# Patient Record
Sex: MALE | Race: WHITE | NOT HISPANIC OR LATINO | Employment: FULL TIME | URBAN - METROPOLITAN AREA
[De-identification: names, ages, dates, MRNs, and addresses within clinical notes are randomized per-mention and may not be internally consistent; named-entity substitution may affect disease eponyms.]

---

## 2017-01-19 ENCOUNTER — RESULTS ENCOUNTER (OUTPATIENT)
Dept: FAMILY MEDICINE CLINIC | Facility: CLINIC | Age: 52
End: 2017-01-19

## 2017-01-19 DIAGNOSIS — I73.00 RAYNAUD'S DISEASE: ICD-10-CM

## 2017-01-19 DIAGNOSIS — E78.5 HYPERLIPIDEMIA: ICD-10-CM

## 2017-01-19 DIAGNOSIS — R17 TOTAL BILIRUBIN, ELEVATED: ICD-10-CM

## 2017-01-19 DIAGNOSIS — E03.9 ACQUIRED HYPOTHYROIDISM: ICD-10-CM

## 2017-01-19 DIAGNOSIS — K21.9 GASTROESOPHAGEAL REFLUX DISEASE, ESOPHAGITIS PRESENCE NOT SPECIFIED: ICD-10-CM

## 2017-02-26 LAB
ALBUMIN SERPL-MCNC: 4.4 G/DL (ref 3.5–5.5)
ALBUMIN/GLOB SERPL: 2 {RATIO} (ref 1.1–2.5)
ALP SERPL-CCNC: 45 IU/L (ref 39–117)
ALT SERPL-CCNC: 25 IU/L (ref 0–44)
AST SERPL-CCNC: 23 IU/L (ref 0–40)
BASOPHILS # BLD AUTO: 0 X10E3/UL (ref 0–0.2)
BASOPHILS NFR BLD AUTO: 1 %
BILIRUB SERPL-MCNC: 1.4 MG/DL (ref 0–1.2)
BUN SERPL-MCNC: 13 MG/DL (ref 6–24)
BUN/CREAT SERPL: 10 (ref 9–20)
CALCIUM SERPL-MCNC: 9.1 MG/DL (ref 8.7–10.2)
CHLORIDE SERPL-SCNC: 102 MMOL/L (ref 96–106)
CHOLEST SERPL-MCNC: 160 MG/DL (ref 100–199)
CO2 SERPL-SCNC: 25 MMOL/L (ref 18–29)
CREAT SERPL-MCNC: 1.24 MG/DL (ref 0.76–1.27)
EOSINOPHIL # BLD AUTO: 0.2 X10E3/UL (ref 0–0.4)
EOSINOPHIL NFR BLD AUTO: 4 %
ERYTHROCYTE [DISTWIDTH] IN BLOOD BY AUTOMATED COUNT: 13.6 % (ref 12.3–15.4)
GLOBULIN SER CALC-MCNC: 2.2 G/DL (ref 1.5–4.5)
GLUCOSE SERPL-MCNC: 103 MG/DL (ref 65–99)
HCT VFR BLD AUTO: 45.3 % (ref 37.5–51)
HDLC SERPL-MCNC: 68 MG/DL
HGB BLD-MCNC: 15.8 G/DL (ref 12.6–17.7)
IMM GRANULOCYTES # BLD: 0 X10E3/UL (ref 0–0.1)
IMM GRANULOCYTES NFR BLD: 0 %
LDLC SERPL CALC-MCNC: 79 MG/DL (ref 0–99)
LDLC/HDLC SERPL: 1.2 RATIO UNITS (ref 0–3.6)
LYMPHOCYTES # BLD AUTO: 1.6 X10E3/UL (ref 0.7–3.1)
LYMPHOCYTES NFR BLD AUTO: 32 %
MCH RBC QN AUTO: 33.8 PG (ref 26.6–33)
MCHC RBC AUTO-ENTMCNC: 34.9 G/DL (ref 31.5–35.7)
MCV RBC AUTO: 97 FL (ref 79–97)
MONOCYTES # BLD AUTO: 0.5 X10E3/UL (ref 0.1–0.9)
MONOCYTES NFR BLD AUTO: 11 %
NEUTROPHILS # BLD AUTO: 2.6 X10E3/UL (ref 1.4–7)
NEUTROPHILS NFR BLD AUTO: 52 %
PLATELET # BLD AUTO: 206 X10E3/UL (ref 150–379)
POTASSIUM SERPL-SCNC: 4.5 MMOL/L (ref 3.5–5.2)
PROT SERPL-MCNC: 6.6 G/DL (ref 6–8.5)
RBC # BLD AUTO: 4.68 X10E6/UL (ref 4.14–5.8)
SODIUM SERPL-SCNC: 143 MMOL/L (ref 134–144)
TRIGL SERPL-MCNC: 67 MG/DL (ref 0–149)
TSH SERPL DL<=0.005 MIU/L-ACNC: 5.18 UIU/ML (ref 0.45–4.5)
VLDLC SERPL CALC-MCNC: 13 MG/DL (ref 5–40)
WBC # BLD AUTO: 4.9 X10E3/UL (ref 3.4–10.8)

## 2017-03-01 ENCOUNTER — OFFICE VISIT (OUTPATIENT)
Dept: FAMILY MEDICINE CLINIC | Facility: CLINIC | Age: 52
End: 2017-03-01

## 2017-03-01 VITALS
HEIGHT: 75 IN | DIASTOLIC BLOOD PRESSURE: 79 MMHG | TEMPERATURE: 97.9 F | BODY MASS INDEX: 26.11 KG/M2 | SYSTOLIC BLOOD PRESSURE: 120 MMHG | WEIGHT: 210 LBS | HEART RATE: 76 BPM | RESPIRATION RATE: 16 BRPM

## 2017-03-01 DIAGNOSIS — E03.9 ACQUIRED HYPOTHYROIDISM: Primary | ICD-10-CM

## 2017-03-01 DIAGNOSIS — R17 TOTAL BILIRUBIN, ELEVATED: ICD-10-CM

## 2017-03-01 DIAGNOSIS — R73.03 PREDIABETES: ICD-10-CM

## 2017-03-01 DIAGNOSIS — E78.5 HYPERLIPIDEMIA, UNSPECIFIED HYPERLIPIDEMIA TYPE: ICD-10-CM

## 2017-03-01 DIAGNOSIS — K21.9 GASTROESOPHAGEAL REFLUX DISEASE, ESOPHAGITIS PRESENCE NOT SPECIFIED: ICD-10-CM

## 2017-03-01 PROCEDURE — 99214 OFFICE O/P EST MOD 30 MIN: CPT | Performed by: FAMILY MEDICINE

## 2017-03-01 RX ORDER — PRAVASTATIN SODIUM 40 MG
40 TABLET ORAL NIGHTLY
Qty: 90 TABLET | Refills: 1 | Status: SHIPPED | OUTPATIENT
Start: 2017-03-01 | End: 2017-08-23 | Stop reason: SDUPTHER

## 2017-03-01 RX ORDER — PRAVASTATIN SODIUM 40 MG
40 TABLET ORAL NIGHTLY
Qty: 14 TABLET | Refills: 0 | Status: SHIPPED | OUTPATIENT
Start: 2017-03-01 | End: 2017-03-01 | Stop reason: SDUPTHER

## 2017-03-01 RX ORDER — ESOMEPRAZOLE MAGNESIUM 40 MG/1
40 CAPSULE, DELAYED RELEASE ORAL
Qty: 90 CAPSULE | Refills: 1 | Status: SHIPPED | OUTPATIENT
Start: 2017-03-01 | End: 2017-08-23 | Stop reason: SINTOL

## 2017-03-01 RX ORDER — LEVOTHYROXINE SODIUM 88 UG/1
88 TABLET ORAL DAILY
Qty: 90 TABLET | Refills: 1 | Status: SHIPPED | OUTPATIENT
Start: 2017-03-01 | End: 2017-08-23 | Stop reason: SDUPTHER

## 2017-03-01 NOTE — PROGRESS NOTES
"Chief Complaint   Patient presents with   • Hyperlipidemia   • Heartburn   • Hypothyroidism       Subjective   This patient presents the office to review labs and refill medicines.  His thyroid is stable but there has been mild interval increase in his TSH.  This happened once before and we will monitor.  His skin condition is largely controlled.  He does periodically need medication to control dermatitis and gets that medication from his dermatologist.  GERD symptoms are stable with Nexium.  Lipids are stable with current statin dose.  Labs continue to show stable elevation of total bilirubin.  His fasting blood sugar is once again elevated but stable.    PHQ-2 Depression Screening Questionaire    During the last month, have you often been bothered by feeling down, depressed, or hopeless?no    During the last month, have you often been bothered by having little interest or pleasure in doing things?no    Review of Systems   Constitutional: Negative for fatigue.   Cardiovascular: Negative for chest pain.       Objective   Visit Vitals   • /79   • Pulse 76   • Temp 97.9 °F (36.6 °C) (Oral)   • Resp 16   • Ht 75\" (190.5 cm)   • Wt 210 lb (95.3 kg)   • BMI 26.25 kg/m2     Body mass index is 26.25 kg/(m^2).  Physical Exam   Constitutional: He is cooperative. No distress.   Eyes: Conjunctivae and lids are normal.   Neck: Carotid bruit is not present. No tracheal deviation present. No thyroid mass and no thyromegaly present.   Cardiovascular: Normal rate, regular rhythm and normal heart sounds.    No murmur heard.  Pulmonary/Chest: Effort normal and breath sounds normal.   Neurological: He is alert. He is not disoriented.   Skin: Skin is warm and dry.   Psychiatric: He has a normal mood and affect. His speech is normal and behavior is normal.   Vitals reviewed.      Assessment/Plan     Problem List Items Addressed This Visit        Cardiovascular and Mediastinum    Hyperlipidemia    Relevant Medications    " pravastatin (PRAVACHOL) 40 MG tablet    Other Relevant Orders    Lipid Panel With LDL/HDL Ratio       Digestive    GERD (gastroesophageal reflux disease)    Relevant Medications    esomeprazole (nexIUM) 40 MG capsule    Other Relevant Orders    Comprehensive metabolic panel    CBC and Differential       Endocrine    Hypothyroidism - Primary    Relevant Medications    levothyroxine (SYNTHROID) 88 MCG tablet    Other Relevant Orders    TSH       Other    Total bilirubin, elevated    Relevant Orders    Comprehensive metabolic panel    Prediabetes    Relevant Orders    Comprehensive metabolic panel    CBC and Differential          Outpatient Encounter Prescriptions as of 3/1/2017   Medication Sig Dispense Refill   • triamcinolone (KENALOG) 0.1 % ointment Apply  topically 2 (two) times a day. 80 g 0   • esomeprazole (nexIUM) 40 MG capsule Take 1 capsule by mouth Every Morning Before Breakfast for 180 days. 90 capsule 1   • levothyroxine (SYNTHROID) 88 MCG tablet Take 1 tablet by mouth Daily for 180 days. 90 tablet 1   • mupirocin (BACTROBAN) 2 % cream Apply  topically 2 (two) times a day. 30 g 0   • pravastatin (PRAVACHOL) 40 MG tablet Take 1 tablet by mouth Every Night. 90 tablet 1   • [DISCONTINUED] esomeprazole (NexIUM) 40 MG capsule Take 1 capsule by mouth every morning before breakfast for 180 days. 90 capsule 1   • [DISCONTINUED] levothyroxine (SYNTHROID) 88 MCG tablet Take 1 tablet by mouth daily for 180 days. 90 tablet 1   • [DISCONTINUED] pravastatin (PRAVACHOL) 40 MG tablet Take 1 tablet by mouth every night for 180 days. 90 tablet 1   • [DISCONTINUED] pravastatin (PRAVACHOL) 40 MG tablet Take 1 tablet by mouth Every Night. 14 tablet 0     No facility-administered encounter medications on file as of 3/1/2017.        Orders Placed This Encounter   Procedures   • Comprehensive metabolic panel     Standing Status:   Future     Standing Expiration Date:   11/26/2017   • Lipid Panel With LDL/HDL Ratio     Standing  Status:   Future     Standing Expiration Date:   11/26/2017   • TSH     Standing Status:   Future     Standing Expiration Date:   11/26/2017       Continue with current treatment plan.         F/U in 6 months

## 2017-03-01 NOTE — PATIENT INSTRUCTIONS
"  Diabetes   Diabetes and Nutrition      Why does it matter what I eat?    What you eat is closely connected to the amount of sugar in your blood. The right food choices will help you control your blood sugar level.    Do I have to follow a special diet?    There isn't one specific \"diabetes diet.\" Your doctor will probably suggest that you work with a registered dietitian to design a meal plan. A meal plan is a guide that tells you what kinds of food to eat at meals and for snacks. The plan also tells you how much food to have. For most people who have diabetes (and those without, too), a healthy diet consists of 40% to 60% of calories from carbohydrates, 20% from protein and 30% or less from fat. It should be low in cholesterol, low in salt and low in added sugar.    Can I eat any sugar?    Yes. In recent years, doctors have learned that eating some sugar doesn't usually cause problems for most people who have diabetes--as long as it is part of a balanced diet. Just be careful about how much sugar you eat and try not to add sugar to foods.    What kinds of foods can I eat?    In general, at each meal you may have 2 to 5 choices (or up to 60 grams) of carbohydrates, 1 choice of protein and a certain amount of fat. Talk to your doctor or dietitian for specific advice.    Carbohydrates. Carbohydrates are found in fruits, vegetables, beans, dairy foods and starchy foods such as breads. Try to have fresh fruits rather than canned fruits, fruit juices or dried fruit. You may eat fresh vegetables and frozen or canned vegetables. Condiments such as nonfat mayonnaise, ketchup and mustard are also carbohydrates.    Protein. Protein is found in meat, poultry, fish, dairy products, beans and some vegetables. Try to eat poultry and fish more often than red meat. Don't eat poultry skin, and trim extra fat from all meat. Choose nonfat or reduced-fat options when you eat dairy, such as cheeses and yogurts.    Fat. Butter, " margarine, lard and oils add fat to food. Fat is also in many dairy and meat products. Try to avoid fried foods, mayonnaise-based dishes (unless they are made with fat-free diez), egg yolks, christensen and high-fat dairy products. Your doctor or dietitian will tell you how many grams of fat you may eat each day. When eating fat-free versions of foods (such as mayonnaise and butter), check the label to see how many grams of carbohydrates they contain. Keep in mind that these products often have added sugar.       This handout was developed by the [employer] in cooperation with the American Diabetes Association.             Diabetes type 2 - meal planning      When you have type 2 diabetes, taking time to plan your meals goes a long way toward controlling your blood sugar and weight.    Your main focus is on keeping your blood sugar (glucose) level in your target range. To help manage your blood sugar, follow a meal plan that has:  •Food from all the food groups  •Fewer calories  •About the same amount of carbohydrates at each meal and snack  •Healthy fats    Along with healthy eating, you can keep your blood sugar in target range by maintaining a healthy weight. Persons with type 2 diabetes are often overweight. Losing just 10 pounds can help you manage your diabetes better. Eating healthy foods and staying active (for example, 30 minutes of walking per day) can help you meet and maintain your weight loss goal.     HOW CARBOHYDRATES AFFECT BLOOD SUGAR    Carbohydrates in food give your body energy. You need to eat carbohydrates to maintain your energy. But carbohydrates also raise your blood sugar higher and faster than other kinds of food.    The main kinds of carbohydrates are starches, sugars, and fiber. Learn which foods have carbohydrates. This will help with meal planning so that you can keep your blood sugar in your target range.    MEAL PLANNING FOR CHILDREN WITH TYPE 2 DIABETES    Meal  plans should consider the amount of calories children need to grow. In general, three small meals and three snacks a day can help meet calorie needs. Many children with type 2 diabetes are overweight. The goal should be a healthy weight by eating healthy foods and getting more activity (60 minutes each day).    Work with a registered dietitian to design a meal plan for your child. A registered dietitian is an expert in food and nutrition.    The following tips can help your child stay on track:  •No food is off-limits. Knowing how different foods affect your child’s blood sugar helps you and your child keep it in target range.  •Help your child learn how much food is a healthy amount. This is called portion control.  •Have your family gradually switch from drinking soda and other sugary drinks, such as sports drinks and juices, to plain water or low-fat milk.            PLANNING MEALS    Everyone has individual needs. Work with your doctor, registered dietitian, or diabetes educator to develop a meal plan that works for you.    When shopping, read food labels to make better food choices.    A good way to make sure you get all the nutrients you need during meals is to use the plate method. This is a visual food guide that helps you choose the best types and right amounts of food to eat. It encourages larger portions of non-starchy vegetables (half the plate) and moderate portions of protein (one quarter of the plate) and starch (one quarter of the plate). You can find more information about the plate method at the American Diabetes Association website: http://www.diabetes.org/food-and-fitness/food/planning-meals/create-your-plate.    EAT A VARIETY OF FOODS    Eating a wide variety of foods helps you stay healthy. Try to include foods from all the food groups at each meal.     VEGETABLES (2½ to 3 cups a day)    Choose fresh or frozen vegetables without added sauces, fats, or salt. Non-starchy vegetables include dark  green and deep yellow vegetables, such as spinach, broccoli, chloé lettuce, cabbage, chard, and bell peppers. Starchy vegetables include corn, green peas, lima beans, potatoes, and taro.    FRUITS (1½ to 2 cups a day)    Choose fresh, frozen, canned (without added sugar), or dried fruits. Try apples, bananas, berries, cherries, fruit cocktail, grapes, melon, oranges, peaches, pears, papaya, pineapple, raisins. Drink juices that are 100% fruit with no added sweeteners or syrups.    GRAINS (3 to 4 ounces a day)    There are two types of grains:  •Whole grains are unprocessed and have the entire grain kernel. Examples are whole-wheat flour, oatmeal, whole cornmeal, amaranth, barley, brown and wild rice, buckwheat, and quinoa.  •Refined grains have been processed (milled) to remove the bran and germ. Examples are white flour, de-germed cornmeal, white bread, and white rice.    Grains have starch, a type of carbohydrate. Carbohydrates raise your blood sugar level. So, for healthy eating, make sure half of the grains you eat each day are whole grains. Whole grains have lots of fiber. Fiber in the diet keeps your blood sugar level from rising too fast.           PROTEIN FOODS (5 to 6½ ounces a day)    Protein foods include meat, poultry, seafood, eggs, beans and peas, nuts, seeds, and processed soy foods. Eat fish and poultry more often. Remove the skin from chicken and turkey. Select lean cuts of beef, veal, pork, or wild game. Trim all visible fat from meat. Bake, roast, broil, grill, or boil instead of frying.    DAIRY (3 cups a day)    Choose low-fat or nonfat dairy products. Be aware that milk, yogurt, and other dairy foods have natural sugar even when they do not contain added sugar. Take this into account when planning meals to stay in your blood sugar target range.        OILS/FATS (no more than 7 teaspoons a day)    Oils are not considered a food group. But they have nutrients that help your body stay healthy.  Oils are different from fats in that oils remain liquid at room temperature. Fats remain solid at room temperature.    Limit your intake of fatty foods, especially those high in saturated fat, such as hamburgers, deep-fried foods, christensen, and butter.     Instead, choose foods that are high in polyunsaturated or monounsaturated fats. These include fish, nuts, and vegetable oils.    Oils can raise your blood sugar, but not as fast as starch. Oils are also high in calories. Try to use no more than the recommended daily limit of 7 teaspoons.    WHAT ABOUT ALCOHOL AND SWEETS?    If you choose to drink alcohol, limit the amount and have it with a meal. Check with your health care provider about how alcohol will affect your blood sugar and to determine a safe amount for you.    Sweets are high in fat and sugar. Keep portion sizes small.     Here are tips to help avoid eating too many sweets:  •Ask for extra spoons and forks and split your dessert with others.  •Eat sweets that are sugar-free.  •Always ask for the smallest serving size or children’s size.      A registered dietitian can help you decide how to balance the carbohydrates, protein, and fat in your diet. Here are some general guidelines:    The amount of each type of food you eat depends on:  •Your diet  •Your weight  •How often you exercise  •Your other health risks    Everyone has individual needs. Work with your doctor, and possibly a dietitian, to develop a meal plan that works for you.    The Diabetes Food Pyramid, which resembles the old USDA food guide pyramid, splits foods into six groups in a range of serving sizes. In the Diabetes Food Pyramid, food groups are based on carbohydrate and protein content instead of their food type. A person with diabetes should eat more of the foods in the bottom of the pyramid (grains, beans, vegetables) than those on the top (fats and sweets). This diet will help keep your heart and body systems healthy.    Another  "method, similar to the new \"plate\" USDA food guide, encourages larger portions of vegetables (half the plate) and moderate portions of protein (one-quarter of the plate) and starch (one-quarter of the plate).    GRAINS, BEANS, AND STARCHY VEGETABLES    (6 or more servings a day)    Foods like bread, grains, beans, rice, pasta, and starchy vegetables are at the bottom of the pyramid because they should serve as the foundation of your diet. As a group, these foods are loaded with vitamins, minerals, fiber, and healthy carbohydrates.    It is important, however, to eat foods with plenty of fiber. Choose whole-grain foods such as whole-grain bread or crackers, tortillas, bran cereal, brown rice, or beans. Use whole-wheat or other whole-grain flours in cooking and baking. Choose low-fat breads, such as bagels, tortillas, English muffins, and georges bread.    VEGETABLES    (3 - 5 servings a day)    Choose fresh or frozen vegetables without added sauces, fats, or salt. Opt for more dark green and deep yellow vegetables, such as spinach, broccoli, chloé lettuce, carrots, and peppers.    FRUITS    (2 - 4 servings a day)    Choose whole fruits more often than juices. Whole fruits have more fiber. Citrus fruits, such as oranges, grapefruits, and tangerines, are best. Drink fruit juices that do NOT have added sweeteners or syrups.    MILK    (2 - 3 servings a day)    Choose low-fat or nonfat milk or yogurt. Yogurt has natural sugar in it, but it can also contain added sugar or artificial sweeteners. Yogurt with artificial sweeteners has fewer calories than yogurt with added sugar.    MEAT AND FISH    (2 - 3 servings a day)    Eat fish and poultry more often. Remove the skin from chicken and turkey. Select lean cuts of beef, veal, pork, or wild game. Trim all visible fat from meat. Bake, roast, broil, grill, or boil instead of frying.    FATS, ALCOHOL, AND SWEETS    In general, you should limit your intake of fatty foods, " especially those high in saturated fat, such as hamburgers, cheese, christensen, and butter.    If you choose to drink alcohol, limit the amount and have it with a meal. Check with your health care provider about how alcohol will affect your blood sugar, and to determine a safe amount for you.    Sweets are high in fat and sugar, so keep portion sizes small. Here are some tips to help avoid eating too many sweets:  •Ask for extra spoons and forks and split your dessert with others.  •Eat sweets that are sugar-free.  •Always ask for the small serving size.    Learn how to read food labels, and consult them when making food decisions.      References      American Diabetes Association. Standards of medical care in diabetes -- 2013.American Diabetes Association. Standards of medical care in diabetes -- 2013. Diabetes Care. 2013;36 Suppl 1:S11-S66

## 2017-07-29 ENCOUNTER — RESULTS ENCOUNTER (OUTPATIENT)
Dept: FAMILY MEDICINE CLINIC | Facility: CLINIC | Age: 52
End: 2017-07-29

## 2017-07-29 DIAGNOSIS — K21.9 GASTROESOPHAGEAL REFLUX DISEASE, ESOPHAGITIS PRESENCE NOT SPECIFIED: ICD-10-CM

## 2017-07-29 DIAGNOSIS — R17 TOTAL BILIRUBIN, ELEVATED: ICD-10-CM

## 2017-07-29 DIAGNOSIS — R73.03 PREDIABETES: ICD-10-CM

## 2017-07-29 DIAGNOSIS — E78.5 HYPERLIPIDEMIA, UNSPECIFIED HYPERLIPIDEMIA TYPE: ICD-10-CM

## 2017-07-29 DIAGNOSIS — E03.9 ACQUIRED HYPOTHYROIDISM: ICD-10-CM

## 2017-08-17 LAB
ALBUMIN SERPL-MCNC: 4.6 G/DL (ref 3.5–5.5)
ALBUMIN/GLOB SERPL: 2.1 {RATIO} (ref 1.2–2.2)
ALP SERPL-CCNC: 42 IU/L (ref 39–117)
ALT SERPL-CCNC: 22 IU/L (ref 0–44)
AST SERPL-CCNC: 26 IU/L (ref 0–40)
BASOPHILS # BLD AUTO: 0 X10E3/UL (ref 0–0.2)
BASOPHILS NFR BLD AUTO: 1 %
BILIRUB SERPL-MCNC: 2.3 MG/DL (ref 0–1.2)
BUN SERPL-MCNC: 14 MG/DL (ref 6–24)
BUN/CREAT SERPL: 10 (ref 9–20)
CALCIUM SERPL-MCNC: 9 MG/DL (ref 8.7–10.2)
CHLORIDE SERPL-SCNC: 99 MMOL/L (ref 96–106)
CHOLEST SERPL-MCNC: 136 MG/DL (ref 100–199)
CO2 SERPL-SCNC: 22 MMOL/L (ref 18–29)
CREAT SERPL-MCNC: 1.38 MG/DL (ref 0.76–1.27)
EOSINOPHIL # BLD AUTO: 0.1 X10E3/UL (ref 0–0.4)
EOSINOPHIL NFR BLD AUTO: 3 %
ERYTHROCYTE [DISTWIDTH] IN BLOOD BY AUTOMATED COUNT: 13 % (ref 12.3–15.4)
GLOBULIN SER CALC-MCNC: 2.2 G/DL (ref 1.5–4.5)
GLUCOSE SERPL-MCNC: 78 MG/DL (ref 65–99)
HCT VFR BLD AUTO: 40.7 % (ref 37.5–51)
HDLC SERPL-MCNC: 64 MG/DL
HGB BLD-MCNC: 14.3 G/DL (ref 12.6–17.7)
IMM GRANULOCYTES # BLD: 0 X10E3/UL (ref 0–0.1)
IMM GRANULOCYTES NFR BLD: 0 %
LDLC SERPL CALC-MCNC: 63 MG/DL (ref 0–99)
LDLC/HDLC SERPL: 1 RATIO UNITS (ref 0–3.6)
LYMPHOCYTES # BLD AUTO: 1.6 X10E3/UL (ref 0.7–3.1)
LYMPHOCYTES NFR BLD AUTO: 38 %
MCH RBC QN AUTO: 32.2 PG (ref 26.6–33)
MCHC RBC AUTO-ENTMCNC: 35.1 G/DL (ref 31.5–35.7)
MCV RBC AUTO: 92 FL (ref 79–97)
MONOCYTES # BLD AUTO: 0.4 X10E3/UL (ref 0.1–0.9)
MONOCYTES NFR BLD AUTO: 10 %
NEUTROPHILS # BLD AUTO: 2.1 X10E3/UL (ref 1.4–7)
NEUTROPHILS NFR BLD AUTO: 48 %
PLATELET # BLD AUTO: 193 X10E3/UL (ref 150–379)
POTASSIUM SERPL-SCNC: 3.7 MMOL/L (ref 3.5–5.2)
PROT SERPL-MCNC: 6.8 G/DL (ref 6–8.5)
RBC # BLD AUTO: 4.44 X10E6/UL (ref 4.14–5.8)
SODIUM SERPL-SCNC: 142 MMOL/L (ref 134–144)
TRIGL SERPL-MCNC: 47 MG/DL (ref 0–149)
TSH SERPL DL<=0.005 MIU/L-ACNC: 4.01 UIU/ML (ref 0.45–4.5)
VLDLC SERPL CALC-MCNC: 9 MG/DL (ref 5–40)
WBC # BLD AUTO: 4.2 X10E3/UL (ref 3.4–10.8)

## 2017-08-21 ENCOUNTER — OFFICE VISIT (OUTPATIENT)
Dept: FAMILY MEDICINE CLINIC | Facility: CLINIC | Age: 52
End: 2017-08-21

## 2017-08-21 DIAGNOSIS — E78.5 HYPERLIPIDEMIA, UNSPECIFIED HYPERLIPIDEMIA TYPE: ICD-10-CM

## 2017-08-21 DIAGNOSIS — K21.9 GASTROESOPHAGEAL REFLUX DISEASE, ESOPHAGITIS PRESENCE NOT SPECIFIED: ICD-10-CM

## 2017-08-21 DIAGNOSIS — E03.9 ACQUIRED HYPOTHYROIDISM: ICD-10-CM

## 2017-08-21 DIAGNOSIS — R73.03 PREDIABETES: ICD-10-CM

## 2017-08-21 DIAGNOSIS — E78.49 OTHER HYPERLIPIDEMIA: Primary | ICD-10-CM

## 2017-08-21 DIAGNOSIS — R17 TOTAL BILIRUBIN, ELEVATED: ICD-10-CM

## 2017-08-21 PROCEDURE — 99214 OFFICE O/P EST MOD 30 MIN: CPT | Performed by: FAMILY MEDICINE

## 2017-08-22 VITALS
SYSTOLIC BLOOD PRESSURE: 123 MMHG | RESPIRATION RATE: 16 BRPM | BODY MASS INDEX: 25.61 KG/M2 | HEART RATE: 76 BPM | WEIGHT: 206 LBS | DIASTOLIC BLOOD PRESSURE: 80 MMHG | HEIGHT: 75 IN | TEMPERATURE: 97.4 F

## 2017-08-23 RX ORDER — LEVOTHYROXINE SODIUM 88 UG/1
88 TABLET ORAL DAILY
Qty: 90 TABLET | Refills: 1
Start: 2017-08-23 | End: 2017-11-30 | Stop reason: SDUPTHER

## 2017-08-23 RX ORDER — RANITIDINE 150 MG/1
150 CAPSULE ORAL 2 TIMES DAILY
Qty: 180 CAPSULE | Refills: 1
Start: 2017-08-23 | End: 2017-11-30 | Stop reason: SDUPTHER

## 2017-08-23 RX ORDER — PRAVASTATIN SODIUM 40 MG
40 TABLET ORAL NIGHTLY
Qty: 90 TABLET | Refills: 1
Start: 2017-08-23 | End: 2017-11-30 | Stop reason: SDUPTHER

## 2017-08-23 NOTE — PROGRESS NOTES
"Chief Complaint   Patient presents with   • Hyperlipidemia   • Hypothyroidism       Subjective   Pt here for med refill. Thyroid stable. gerd stable. Hyperlipidemia stable. Elevated creatinine noted. Total bilirubin stable.  I have reviewed and updated his medications, medical history and problem list during today's office visit.        Social History   Substance Use Topics   • Smoking status: Former Smoker     Packs/day: 0.50   • Smokeless tobacco: Never Used   • Alcohol use No       Review of Systems   Constitutional: Negative for fatigue.   Cardiovascular: Negative for chest pain.       Objective   /80  Pulse 76  Temp 97.4 °F (36.3 °C) (Oral)   Resp 16  Ht 75\" (190.5 cm)  Wt 206 lb (93.4 kg)  BMI 25.75 kg/m2  Body mass index is 25.75 kg/(m^2).  Physical Exam   Constitutional: He is cooperative. No distress.   Eyes: Conjunctivae and lids are normal.   Neck: Carotid bruit is not present. No tracheal deviation present.   Cardiovascular: Normal rate, regular rhythm and normal heart sounds.    No murmur heard.  Pulmonary/Chest: Effort normal and breath sounds normal.   Neurological: He is alert. He is not disoriented.   Skin: Skin is warm and dry.   Psychiatric: He has a normal mood and affect. His speech is normal and behavior is normal.   Vitals reviewed.      Data Reviewed:        Assessment/Plan     Problem List Items Addressed This Visit        Cardiovascular and Mediastinum    Hyperlipidemia - Primary    Relevant Medications    pravastatin (PRAVACHOL) 40 MG tablet    Other Relevant Orders    Comprehensive metabolic panel    Lipid Panel With LDL/HDL Ratio    CBC and Differential       Digestive    GERD (gastroesophageal reflux disease)    Relevant Medications    ranitidine (ZANTAC) 150 MG capsule       Endocrine    Hypothyroidism    Relevant Medications    levothyroxine (SYNTHROID) 88 MCG tablet    Other Relevant Orders    TSH       Other    Total bilirubin, elevated    Prediabetes          Outpatient " Encounter Prescriptions as of 8/21/2017   Medication Sig Dispense Refill   • levothyroxine (SYNTHROID) 88 MCG tablet Take 1 tablet by mouth Daily for 180 days. 90 tablet 1   • mupirocin (BACTROBAN) 2 % cream Apply  topically 2 (two) times a day. 30 g 0   • pravastatin (PRAVACHOL) 40 MG tablet Take 1 tablet by mouth Every Night. 90 tablet 1   • triamcinolone (KENALOG) 0.1 % ointment Apply  topically 2 (two) times a day. 80 g 0   • [DISCONTINUED] esomeprazole (nexIUM) 40 MG capsule Take 1 capsule by mouth Every Morning Before Breakfast for 180 days. 90 capsule 1   • [DISCONTINUED] levothyroxine (SYNTHROID) 88 MCG tablet Take 1 tablet by mouth Daily for 180 days. 90 tablet 1   • [DISCONTINUED] pravastatin (PRAVACHOL) 40 MG tablet Take 1 tablet by mouth Every Night. 90 tablet 1   • ranitidine (ZANTAC) 150 MG capsule Take 1 capsule by mouth 2 (Two) Times a Day for 180 days. 180 capsule 1     No facility-administered encounter medications on file as of 8/21/2017.        Orders Placed This Encounter   Procedures   • Comprehensive metabolic panel     Standing Status:   Future     Standing Expiration Date:   5/20/2018   • Lipid Panel With LDL/HDL Ratio     Standing Status:   Future     Standing Expiration Date:   5/20/2018   • TSH     Standing Status:   Future     Standing Expiration Date:   5/20/2018   • CBC and Differential     Standing Status:   Future     Standing Expiration Date:   5/20/2018     Order Specific Question:   Manual Differential     Answer:   No       Continue with current treatment plan.  Avoid NSAIDS  Drink 6-8 glasses water daily       F/U in 6 months

## 2017-11-30 ENCOUNTER — TELEPHONE (OUTPATIENT)
Dept: FAMILY MEDICINE CLINIC | Facility: CLINIC | Age: 52
End: 2017-11-30

## 2017-11-30 DIAGNOSIS — K21.9 GASTROESOPHAGEAL REFLUX DISEASE, ESOPHAGITIS PRESENCE NOT SPECIFIED: ICD-10-CM

## 2017-11-30 DIAGNOSIS — E03.9 ACQUIRED HYPOTHYROIDISM: ICD-10-CM

## 2017-11-30 DIAGNOSIS — E78.5 HYPERLIPIDEMIA, UNSPECIFIED HYPERLIPIDEMIA TYPE: ICD-10-CM

## 2017-11-30 RX ORDER — LEVOTHYROXINE SODIUM 88 UG/1
88 TABLET ORAL DAILY
Qty: 90 TABLET | Refills: 1
Start: 2017-11-30 | End: 2017-12-13 | Stop reason: SDUPTHER

## 2017-11-30 RX ORDER — PRAVASTATIN SODIUM 40 MG
40 TABLET ORAL NIGHTLY
Qty: 90 TABLET | Refills: 1
Start: 2017-11-30 | End: 2017-12-13 | Stop reason: SDUPTHER

## 2017-11-30 RX ORDER — RANITIDINE 150 MG/1
150 CAPSULE ORAL 2 TIMES DAILY
Qty: 180 CAPSULE | Refills: 1
Start: 2017-11-30 | End: 2017-12-13 | Stop reason: SDUPTHER

## 2017-12-13 ENCOUNTER — TELEPHONE (OUTPATIENT)
Dept: FAMILY MEDICINE CLINIC | Facility: CLINIC | Age: 52
End: 2017-12-13

## 2017-12-13 DIAGNOSIS — E03.9 ACQUIRED HYPOTHYROIDISM: ICD-10-CM

## 2017-12-13 DIAGNOSIS — E78.5 HYPERLIPIDEMIA, UNSPECIFIED HYPERLIPIDEMIA TYPE: ICD-10-CM

## 2017-12-13 DIAGNOSIS — K21.9 GASTROESOPHAGEAL REFLUX DISEASE, ESOPHAGITIS PRESENCE NOT SPECIFIED: ICD-10-CM

## 2017-12-13 RX ORDER — RANITIDINE 150 MG/1
150 CAPSULE ORAL 2 TIMES DAILY
Qty: 90 CAPSULE | Refills: 1 | Status: SHIPPED | OUTPATIENT
Start: 2017-12-13 | End: 2018-06-06 | Stop reason: SDUPTHER

## 2017-12-13 RX ORDER — LEVOTHYROXINE SODIUM 88 UG/1
88 TABLET ORAL DAILY
Qty: 90 TABLET | Refills: 1 | Status: SHIPPED | OUTPATIENT
Start: 2017-12-13 | End: 2018-06-06 | Stop reason: SDUPTHER

## 2017-12-13 RX ORDER — PRAVASTATIN SODIUM 40 MG
40 TABLET ORAL NIGHTLY
Qty: 14 TABLET | Refills: 0 | Status: SHIPPED | OUTPATIENT
Start: 2017-12-13 | End: 2017-12-13 | Stop reason: SDUPTHER

## 2017-12-13 RX ORDER — LEVOTHYROXINE SODIUM 88 UG/1
88 TABLET ORAL DAILY
Qty: 14 TABLET | Refills: 0 | Status: SHIPPED | OUTPATIENT
Start: 2017-12-13 | End: 2017-12-13 | Stop reason: SDUPTHER

## 2017-12-13 RX ORDER — RANITIDINE 150 MG/1
150 CAPSULE ORAL 2 TIMES DAILY
Qty: 14 CAPSULE | Refills: 0 | Status: SHIPPED | OUTPATIENT
Start: 2017-12-13 | End: 2017-12-13 | Stop reason: SDUPTHER

## 2017-12-13 RX ORDER — PRAVASTATIN SODIUM 40 MG
40 TABLET ORAL NIGHTLY
Qty: 90 TABLET | Refills: 1 | Status: SHIPPED | OUTPATIENT
Start: 2017-12-13 | End: 2018-06-06 | Stop reason: SDUPTHER

## 2018-01-20 ENCOUNTER — RESULTS ENCOUNTER (OUTPATIENT)
Dept: FAMILY MEDICINE CLINIC | Facility: CLINIC | Age: 53
End: 2018-01-20

## 2018-01-20 DIAGNOSIS — E03.9 ACQUIRED HYPOTHYROIDISM: ICD-10-CM

## 2018-01-20 DIAGNOSIS — E78.49 OTHER HYPERLIPIDEMIA: ICD-10-CM

## 2018-06-01 LAB
ALBUMIN SERPL-MCNC: 4.6 G/DL (ref 3.5–5.2)
ALBUMIN/GLOB SERPL: 2 G/DL
ALP SERPL-CCNC: 44 U/L (ref 39–117)
ALT SERPL-CCNC: 22 U/L (ref 1–41)
AST SERPL-CCNC: 21 U/L (ref 1–40)
BASOPHILS # BLD AUTO: 0.03 10*3/MM3 (ref 0–0.2)
BASOPHILS NFR BLD AUTO: 0.6 % (ref 0–1.5)
BILIRUB SERPL-MCNC: 2.2 MG/DL (ref 0.1–1.2)
BUN SERPL-MCNC: 12 MG/DL (ref 6–20)
BUN/CREAT SERPL: 9.8 (ref 7–25)
CALCIUM SERPL-MCNC: 9.3 MG/DL (ref 8.6–10.5)
CHLORIDE SERPL-SCNC: 100 MMOL/L (ref 98–107)
CHOLEST SERPL-MCNC: 166 MG/DL (ref 0–200)
CO2 SERPL-SCNC: 29.2 MMOL/L (ref 22–29)
CREAT SERPL-MCNC: 1.23 MG/DL (ref 0.76–1.27)
EOSINOPHIL # BLD AUTO: 0.22 10*3/MM3 (ref 0–0.7)
EOSINOPHIL NFR BLD AUTO: 4.2 % (ref 0.3–6.2)
ERYTHROCYTE [DISTWIDTH] IN BLOOD BY AUTOMATED COUNT: 13 % (ref 11.5–14.5)
GFR SERPLBLD CREATININE-BSD FMLA CKD-EPI: 61 ML/MIN/1.73
GFR SERPLBLD CREATININE-BSD FMLA CKD-EPI: 74 ML/MIN/1.73
GLOBULIN SER CALC-MCNC: 2.3 GM/DL
GLUCOSE SERPL-MCNC: 96 MG/DL (ref 65–99)
HCT VFR BLD AUTO: 47.6 % (ref 40.4–52.2)
HDLC SERPL-MCNC: 61 MG/DL (ref 40–60)
HGB BLD-MCNC: 16.4 G/DL (ref 13.7–17.6)
IMM GRANULOCYTES # BLD: 0 10*3/MM3 (ref 0–0.03)
IMM GRANULOCYTES NFR BLD: 0 % (ref 0–0.5)
LDLC SERPL CALC-MCNC: 81 MG/DL (ref 0–100)
LDLC/HDLC SERPL: 1.33 {RATIO}
LYMPHOCYTES # BLD AUTO: 1.72 10*3/MM3 (ref 0.9–4.8)
LYMPHOCYTES NFR BLD AUTO: 33.2 % (ref 19.6–45.3)
MCH RBC QN AUTO: 33.7 PG (ref 27–32.7)
MCHC RBC AUTO-ENTMCNC: 34.5 G/DL (ref 32.6–36.4)
MCV RBC AUTO: 97.7 FL (ref 79.8–96.2)
MONOCYTES # BLD AUTO: 0.46 10*3/MM3 (ref 0.2–1.2)
MONOCYTES NFR BLD AUTO: 8.9 % (ref 5–12)
NEUTROPHILS # BLD AUTO: 2.75 10*3/MM3 (ref 1.9–8.1)
NEUTROPHILS NFR BLD AUTO: 53.1 % (ref 42.7–76)
PLATELET # BLD AUTO: 195 10*3/MM3 (ref 140–500)
POTASSIUM SERPL-SCNC: 4.8 MMOL/L (ref 3.5–5.2)
PROT SERPL-MCNC: 6.9 G/DL (ref 6–8.5)
RBC # BLD AUTO: 4.87 10*6/MM3 (ref 4.6–6)
SODIUM SERPL-SCNC: 143 MMOL/L (ref 136–145)
TRIGL SERPL-MCNC: 118 MG/DL (ref 0–150)
TSH SERPL DL<=0.005 MIU/L-ACNC: 4.71 MIU/ML (ref 0.27–4.2)
VLDLC SERPL CALC-MCNC: 23.6 MG/DL (ref 5–40)
WBC # BLD AUTO: 5.18 10*3/MM3 (ref 4.5–10.7)

## 2018-06-06 ENCOUNTER — OFFICE VISIT (OUTPATIENT)
Dept: FAMILY MEDICINE CLINIC | Facility: CLINIC | Age: 53
End: 2018-06-06

## 2018-06-06 VITALS
BODY MASS INDEX: 25.24 KG/M2 | TEMPERATURE: 97.2 F | DIASTOLIC BLOOD PRESSURE: 69 MMHG | HEIGHT: 75 IN | WEIGHT: 203 LBS | RESPIRATION RATE: 16 BRPM | SYSTOLIC BLOOD PRESSURE: 103 MMHG | HEART RATE: 79 BPM

## 2018-06-06 DIAGNOSIS — E03.9 ACQUIRED HYPOTHYROIDISM: Primary | ICD-10-CM

## 2018-06-06 DIAGNOSIS — E78.00 PURE HYPERCHOLESTEROLEMIA: ICD-10-CM

## 2018-06-06 DIAGNOSIS — R17 TOTAL BILIRUBIN, ELEVATED: ICD-10-CM

## 2018-06-06 DIAGNOSIS — K21.9 GASTROESOPHAGEAL REFLUX DISEASE WITHOUT ESOPHAGITIS: ICD-10-CM

## 2018-06-06 PROBLEM — R73.03 PREDIABETES: Status: RESOLVED | Noted: 2017-03-01 | Resolved: 2018-06-06

## 2018-06-06 PROCEDURE — 99214 OFFICE O/P EST MOD 30 MIN: CPT | Performed by: FAMILY MEDICINE

## 2018-06-06 RX ORDER — PRAVASTATIN SODIUM 40 MG
40 TABLET ORAL NIGHTLY
Qty: 90 TABLET | Refills: 1 | Status: SHIPPED | OUTPATIENT
Start: 2018-06-06 | End: 2018-12-10 | Stop reason: SDUPTHER

## 2018-06-06 RX ORDER — LEVOTHYROXINE SODIUM 88 UG/1
88 TABLET ORAL DAILY
Qty: 90 TABLET | Refills: 1 | Status: SHIPPED | OUTPATIENT
Start: 2018-06-06 | End: 2018-12-13 | Stop reason: SDUPTHER

## 2018-06-06 RX ORDER — RANITIDINE 150 MG/1
150 CAPSULE ORAL 2 TIMES DAILY
Qty: 180 CAPSULE | Refills: 1 | Status: SHIPPED | OUTPATIENT
Start: 2018-06-06 | End: 2018-12-10

## 2018-06-06 NOTE — PROGRESS NOTES
"Chief Complaint   Patient presents with   • Hypothyroidism   • Hyperlipidemia       Subjective     Rafita Gary presents to the office today to refill his medications and review recent labs. No medication side effects are reported.  His thyroid is controlled.  GERD symptoms are stable.  Since changing from proton pump inhibitor back to H2 blocker his kidney function has returned to normal.  H2 blocker is not quite as effective as proton pump inhibitor.  Cholesterol is controlled.  Elevation of total bilirubin is stable.  His TSH is just minimally elevated but is not associated with any evidence of clinical hypothyroidism.    I have reviewed and updated his medications, medical history and problem list during today's office visit.      Patient Care Team:  Gilson Lancaster MD as PCP - General (Family Medicine)  Gilson Lancaster MD as PCP - Family Medicine    Social History   Substance Use Topics   • Smoking status: Former Smoker     Packs/day: 0.50     Years: 15.00     Start date: 7/1/1973     Quit date: 2/17/1988   • Smokeless tobacco: Never Used   • Alcohol use No       Review of Systems   Constitutional: Negative for fatigue.   Cardiovascular: Negative for chest pain.       Objective     /69   Pulse 79   Temp 97.2 °F (36.2 °C) (Oral)   Resp 16   Ht 190.5 cm (75\")   Wt 92.1 kg (203 lb)   BMI 25.37 kg/m²     Body mass index is 25.37 kg/m².    Physical Exam   Constitutional: He is oriented to person, place, and time. He appears well-developed. No distress.   Eyes: Conjunctivae and lids are normal.   Neck: Trachea normal and phonation normal. No tracheal tenderness present. Carotid bruit is not present. No thyroid mass and no thyromegaly present.   Cardiovascular: Normal rate, regular rhythm and normal heart sounds.    Pulmonary/Chest: Effort normal and breath sounds normal.   Neurological: He is alert and oriented to person, place, and time.   Skin: Skin is warm and dry.   Psychiatric: He has a normal mood " and affect. His behavior is normal.   Vitals reviewed.      Data Reviewed:         Lab Results   Component Value Date    GLU 96 05/31/2018    BUN 12 05/31/2018    CREATININE 1.23 05/31/2018    EGFRIFNONA 61 05/31/2018    EGFRIFAFRI 74 05/31/2018     05/31/2018    K 4.8 05/31/2018     05/31/2018    CALCIUM 9.3 05/31/2018    PROTENTOTREF 6.9 05/31/2018    ALBUMIN 4.60 05/31/2018    LABGLOBREF 2.3 05/31/2018    BILITOT 2.2 (H) 05/31/2018    ALKPHOS 44 05/31/2018    AST 21 05/31/2018    ALT 22 05/31/2018     CBC w/ diff:   Lab Results   Component Value Date    WBC 4.2 08/16/2017    RBC 4.87 05/31/2018    HGB 16.4 05/31/2018    HCT 47.6 05/31/2018    MCV 97.7 (H) 05/31/2018    MCH 33.7 (H) 05/31/2018    MCHC 34.5 05/31/2018    RDW 13.0 05/31/2018     05/31/2018    NEUTRORELPCT 53.1 05/31/2018    LYMPHORELPCT 33.2 05/31/2018    MONORELPCT 8.9 05/31/2018    EOSRELPCT 4.2 05/31/2018    BASORELPCT 0.6 05/31/2018     LIPID PANEL:  Lab Results   Component Value Date    CHLPL 166 05/31/2018    TRIG 118 05/31/2018    HDL 61 (H) 05/31/2018    VLDL 23.6 05/31/2018    LDL 81 05/31/2018    LDLHDL 1.33 05/31/2018     TSH:  Lab Results   Component Value Date    TSH 4.710 (H) 05/31/2018       Assessment/Plan     Problem List Items Addressed This Visit     Acquired hypothyroidism - Primary    Relevant Medications    levothyroxine (SYNTHROID) 88 MCG tablet    Other Relevant Orders    TSH    Gastroesophageal reflux disease without esophagitis    Relevant Medications    ranitidine (ZANTAC) 150 MG capsule    Pure hypercholesterolemia    Relevant Medications    pravastatin (PRAVACHOL) 40 MG tablet    Other Relevant Orders    Comprehensive metabolic panel    Lipid Panel With LDL/HDL Ratio    CBC and Differential    Total bilirubin, elevated          Orders Placed This Encounter   Procedures   • Comprehensive metabolic panel     Standing Status:   Future     Standing Expiration Date:   3/3/2019   • Lipid Panel With LDL/HDL  Ratio     Standing Status:   Future     Standing Expiration Date:   3/3/2019   • TSH     Standing Status:   Future     Standing Expiration Date:   3/3/2019   • CBC and Differential     Standing Status:   Future     Standing Expiration Date:   3/3/2019     Order Specific Question:   Manual Differential     Answer:   No         Current Outpatient Prescriptions:   •  levothyroxine (SYNTHROID) 88 MCG tablet, Take 1 tablet by mouth Daily for 180 days., Disp: 90 tablet, Rfl: 1  •  pravastatin (PRAVACHOL) 40 MG tablet, Take 1 tablet by mouth Every Night., Disp: 90 tablet, Rfl: 1  •  ranitidine (ZANTAC) 150 MG capsule, Take 1 capsule by mouth 2 (Two) Times a Day for 180 days., Disp: 180 capsule, Rfl: 1    Return in about 6 months (around 12/6/2018) for Recheck.

## 2018-11-03 ENCOUNTER — RESULTS ENCOUNTER (OUTPATIENT)
Dept: FAMILY MEDICINE CLINIC | Facility: CLINIC | Age: 53
End: 2018-11-03

## 2018-11-03 DIAGNOSIS — E78.00 PURE HYPERCHOLESTEROLEMIA: ICD-10-CM

## 2018-11-03 DIAGNOSIS — E03.9 ACQUIRED HYPOTHYROIDISM: ICD-10-CM

## 2018-12-07 LAB
ALBUMIN SERPL-MCNC: 4.6 G/DL (ref 3.5–5.5)
ALBUMIN/GLOB SERPL: 2.2 {RATIO} (ref 1.2–2.2)
ALP SERPL-CCNC: 42 IU/L (ref 39–117)
ALT SERPL-CCNC: 17 IU/L (ref 0–44)
AST SERPL-CCNC: 25 IU/L (ref 0–40)
BASOPHILS # BLD AUTO: 0.1 X10E3/UL (ref 0–0.2)
BASOPHILS NFR BLD AUTO: 1 %
BILIRUB SERPL-MCNC: 1.5 MG/DL (ref 0–1.2)
BUN SERPL-MCNC: 16 MG/DL (ref 6–24)
BUN/CREAT SERPL: 11 (ref 9–20)
CALCIUM SERPL-MCNC: 9.4 MG/DL (ref 8.7–10.2)
CHLORIDE SERPL-SCNC: 102 MMOL/L (ref 96–106)
CHOLEST SERPL-MCNC: 160 MG/DL (ref 100–199)
CO2 SERPL-SCNC: 25 MMOL/L (ref 20–29)
CREAT SERPL-MCNC: 1.46 MG/DL (ref 0.76–1.27)
EOSINOPHIL # BLD AUTO: 0.4 X10E3/UL (ref 0–0.4)
EOSINOPHIL NFR BLD AUTO: 7 %
ERYTHROCYTE [DISTWIDTH] IN BLOOD BY AUTOMATED COUNT: 13.7 % (ref 12.3–15.4)
GLOBULIN SER CALC-MCNC: 2.1 G/DL (ref 1.5–4.5)
GLUCOSE SERPL-MCNC: 86 MG/DL (ref 65–99)
HCT VFR BLD AUTO: 43.3 % (ref 37.5–51)
HDLC SERPL-MCNC: 61 MG/DL
HGB BLD-MCNC: 14.6 G/DL (ref 13–17.7)
IMM GRANULOCYTES # BLD: 0 X10E3/UL (ref 0–0.1)
IMM GRANULOCYTES NFR BLD: 0 %
LDLC SERPL CALC-MCNC: 85 MG/DL (ref 0–99)
LDLC/HDLC SERPL: 1.4 RATIO (ref 0–3.6)
LYMPHOCYTES # BLD AUTO: 2 X10E3/UL (ref 0.7–3.1)
LYMPHOCYTES NFR BLD AUTO: 36 %
MCH RBC QN AUTO: 33 PG (ref 26.6–33)
MCHC RBC AUTO-ENTMCNC: 33.7 G/DL (ref 31.5–35.7)
MCV RBC AUTO: 98 FL (ref 79–97)
MONOCYTES # BLD AUTO: 0.7 X10E3/UL (ref 0.1–0.9)
MONOCYTES NFR BLD AUTO: 13 %
NEUTROPHILS # BLD AUTO: 2.3 X10E3/UL (ref 1.4–7)
NEUTROPHILS NFR BLD AUTO: 43 %
PLATELET # BLD AUTO: 204 X10E3/UL (ref 150–379)
POTASSIUM SERPL-SCNC: 4.3 MMOL/L (ref 3.5–5.2)
PROT SERPL-MCNC: 6.7 G/DL (ref 6–8.5)
RBC # BLD AUTO: 4.42 X10E6/UL (ref 4.14–5.8)
SODIUM SERPL-SCNC: 143 MMOL/L (ref 134–144)
TRIGL SERPL-MCNC: 71 MG/DL (ref 0–149)
TSH SERPL DL<=0.005 MIU/L-ACNC: 7.53 UIU/ML (ref 0.45–4.5)
VLDLC SERPL CALC-MCNC: 14 MG/DL (ref 5–40)
WBC # BLD AUTO: 5.5 X10E3/UL (ref 3.4–10.8)

## 2018-12-10 ENCOUNTER — OFFICE VISIT (OUTPATIENT)
Dept: FAMILY MEDICINE CLINIC | Facility: CLINIC | Age: 53
End: 2018-12-10

## 2018-12-10 VITALS
BODY MASS INDEX: 25.74 KG/M2 | HEIGHT: 75 IN | OXYGEN SATURATION: 98 % | DIASTOLIC BLOOD PRESSURE: 87 MMHG | WEIGHT: 207 LBS | TEMPERATURE: 97.8 F | HEART RATE: 77 BPM | SYSTOLIC BLOOD PRESSURE: 128 MMHG

## 2018-12-10 DIAGNOSIS — N28.9 DECREASED RENAL FUNCTION: Primary | ICD-10-CM

## 2018-12-10 DIAGNOSIS — K21.9 GASTROESOPHAGEAL REFLUX DISEASE WITHOUT ESOPHAGITIS: ICD-10-CM

## 2018-12-10 DIAGNOSIS — E03.9 ACQUIRED HYPOTHYROIDISM: ICD-10-CM

## 2018-12-10 DIAGNOSIS — E78.00 PURE HYPERCHOLESTEROLEMIA: ICD-10-CM

## 2018-12-10 PROCEDURE — 99214 OFFICE O/P EST MOD 30 MIN: CPT | Performed by: NURSE PRACTITIONER

## 2018-12-10 RX ORDER — ESOMEPRAZOLE MAGNESIUM 40 MG/1
40 CAPSULE, DELAYED RELEASE ORAL
Qty: 90 CAPSULE | Refills: 1 | Status: SHIPPED | OUTPATIENT
Start: 2018-12-10 | End: 2019-06-12 | Stop reason: SDUPTHER

## 2018-12-10 RX ORDER — PRAVASTATIN SODIUM 40 MG
40 TABLET ORAL NIGHTLY
Qty: 90 TABLET | Refills: 1 | Status: SHIPPED | OUTPATIENT
Start: 2018-12-10 | End: 2019-06-12 | Stop reason: SDUPTHER

## 2018-12-10 NOTE — PROGRESS NOTES
Subjective   Rafita Gary is a 53 y.o. male.     History of Present Illness   Rafita Gary 53 y.o. male who presents today for routine follow up check and medication refills.  he has a history of   Patient Active Problem List   Diagnosis   • Acquired hypothyroidism   • Gastroesophageal reflux disease without esophagitis   • Pure hypercholesterolemia   • Raynaud's disease   • Total bilirubin, elevated   .  Since the last visit, he has overall felt well.  He has Hypertenision and is well controlled on medication.  He takes levothyroxine for hypothyroidism. His recent labs show elevated TSH.  He states he has been taking as prescribed.  Patient has been taking ranitidine 150 mg bid for 6 months but states he still has daily breakthrough reflux and has to take tums regularly. He was previously on nexium and then protonix.  States both of those worked well for him.  he has been compliant with current medications have reviewed them.  The patient denies medication side effects.    Results for orders placed or performed in visit on 11/03/18   Comprehensive metabolic panel   Result Value Ref Range    Glucose 86 65 - 99 mg/dL    BUN 16 6 - 24 mg/dL    Creatinine 1.46 (H) 0.76 - 1.27 mg/dL    eGFR Non African Am 53 (L) >59 mL/min/1.73    eGFR African Am 62 >59 mL/min/1.73    BUN/Creatinine Ratio 11 9 - 20    Sodium 143 134 - 144 mmol/L    Potassium 4.3 3.5 - 5.2 mmol/L    Chloride 102 96 - 106 mmol/L    Total CO2 25 20 - 29 mmol/L    Calcium 9.4 8.7 - 10.2 mg/dL    Total Protein 6.7 6.0 - 8.5 g/dL    Albumin 4.6 3.5 - 5.5 g/dL    Globulin 2.1 1.5 - 4.5 g/dL    A/G Ratio 2.2 1.2 - 2.2    Total Bilirubin 1.5 (H) 0.0 - 1.2 mg/dL    Alkaline Phosphatase 42 39 - 117 IU/L    AST (SGOT) 25 0 - 40 IU/L    ALT (SGPT) 17 0 - 44 IU/L   Lipid Panel With LDL/HDL Ratio   Result Value Ref Range    Total Cholesterol 160 100 - 199 mg/dL    Triglycerides 71 0 - 149 mg/dL    HDL Cholesterol 61 >39 mg/dL    VLDL Cholesterol 14 5 - 40  mg/dL    LDL Cholesterol  85 0 - 99 mg/dL    LDL/HDL Ratio 1.4 0.0 - 3.6 ratio   TSH   Result Value Ref Range    TSH 7.530 (H) 0.450 - 4.500 uIU/mL   CBC and Differential   Result Value Ref Range    WBC 5.5 3.4 - 10.8 x10E3/uL    RBC 4.42 4.14 - 5.80 x10E6/uL    Hemoglobin 14.6 13.0 - 17.7 g/dL    Hematocrit 43.3 37.5 - 51.0 %    MCV 98 (H) 79 - 97 fL    MCH 33.0 26.6 - 33.0 pg    MCHC 33.7 31.5 - 35.7 g/dL    RDW 13.7 12.3 - 15.4 %    Platelets 204 150 - 379 x10E3/uL    Neutrophil Rel % 43 Not Estab. %    Lymphocyte Rel % 36 Not Estab. %    Monocyte Rel % 13 Not Estab. %    Eosinophil Rel % 7 Not Estab. %    Basophil Rel % 1 Not Estab. %    Neutrophils Absolute 2.3 1.4 - 7.0 x10E3/uL    Lymphocytes Absolute 2.0 0.7 - 3.1 x10E3/uL    Monocytes Absolute 0.7 0.1 - 0.9 x10E3/uL    Eosinophils Absolute 0.4 0.0 - 0.4 x10E3/uL    Basophils Absolute 0.1 0.0 - 0.2 x10E3/uL    Immature Granulocyte Rel % 0 Not Estab. %    Immature Grans Absolute 0.0 0.0 - 0.1 x10E3/uL       The following portions of the patient's history were reviewed and updated as appropriate: allergies, current medications, past family history, past medical history, past social history, past surgical history and problem list.    Review of Systems   Constitutional: Negative for fatigue and unexpected weight change.   Respiratory: Negative for cough and shortness of breath.    Cardiovascular: Negative for chest pain and palpitations.   Gastrointestinal: Negative for abdominal distention, abdominal pain, anal bleeding, blood in stool, constipation, diarrhea, nausea, rectal pain and vomiting.   Endocrine: Negative for cold intolerance, heat intolerance, polydipsia, polyphagia and polyuria.   Skin: Negative for rash.   Psychiatric/Behavioral: Negative for dysphoric mood and sleep disturbance. The patient is not nervous/anxious.        Objective   Physical Exam   Constitutional: He is oriented to person, place, and time. He appears well-developed and  well-nourished.   Neck: Carotid bruit is not present.   Cardiovascular: Normal rate and regular rhythm.   Pulmonary/Chest: Effort normal and breath sounds normal.   Neurological: He is oriented to person, place, and time.   Skin: Skin is warm and dry.   Psychiatric: He has a normal mood and affect. His behavior is normal. Judgment and thought content normal.   Nursing note and vitals reviewed.      Assessment/Plan   Rafita was seen today for hypothyroidism, gi problem and hyperlipidemia.    Diagnoses and all orders for this visit:    Decreased renal function  -     Basic metabolic panel    Gastroesophageal reflux disease without esophagitis  -     esomeprazole (NEXIUM) 40 MG capsule; Take 1 capsule by mouth Every Morning Before Breakfast.    Pure hypercholesterolemia  -     pravastatin (PRAVACHOL) 40 MG tablet; Take 1 tablet by mouth Every Night.    Acquired hypothyroidism  -     T4, free  -     T3    Add T3 and free T4 to labs recently drawn.  Will refill levothyroxine once reviewed.    Repeat nonfasting BMP in 2 weeks to recheck renal function. Discussed with patient that he needs to decreased caffeine and increase water intake. He is not taking NSAIDs.    Stop ranitidine and restart nexium.  Will monitor labs.

## 2018-12-11 LAB
Lab: NORMAL
T3 SERPL-MCNC: 107 NG/DL (ref 71–180)
T4 FREE SERPL-MCNC: 1.27 NG/DL (ref 0.82–1.77)
WRITTEN AUTHORIZATION: NORMAL

## 2018-12-13 RX ORDER — LEVOTHYROXINE SODIUM 88 UG/1
88 TABLET ORAL DAILY
Qty: 90 TABLET | Refills: 1 | Status: SHIPPED | OUTPATIENT
Start: 2018-12-13 | End: 2019-06-12 | Stop reason: SDUPTHER

## 2018-12-22 LAB
BUN SERPL-MCNC: 20 MG/DL (ref 6–20)
BUN/CREAT SERPL: 16.4 (ref 7–25)
CALCIUM SERPL-MCNC: 8.9 MG/DL (ref 8.6–10.5)
CHLORIDE SERPL-SCNC: 105 MMOL/L (ref 98–107)
CO2 SERPL-SCNC: 26.5 MMOL/L (ref 22–29)
CREAT SERPL-MCNC: 1.22 MG/DL (ref 0.76–1.27)
GLUCOSE SERPL-MCNC: 135 MG/DL (ref 65–99)
POTASSIUM SERPL-SCNC: 4.2 MMOL/L (ref 3.5–5.2)
SODIUM SERPL-SCNC: 143 MMOL/L (ref 136–145)
T3 SERPL-MCNC: 108.8 NG/DL (ref 80–200)
T4 FREE SERPL-MCNC: 1.15 NG/DL (ref 0.93–1.7)

## 2019-06-06 ENCOUNTER — TELEPHONE (OUTPATIENT)
Dept: FAMILY MEDICINE CLINIC | Facility: CLINIC | Age: 54
End: 2019-06-06

## 2019-06-06 DIAGNOSIS — E78.00 PURE HYPERCHOLESTEROLEMIA: Primary | ICD-10-CM

## 2019-06-06 DIAGNOSIS — Z12.5 SCREENING FOR PROSTATE CANCER: ICD-10-CM

## 2019-06-06 DIAGNOSIS — E03.9 ACQUIRED HYPOTHYROIDISM: ICD-10-CM

## 2019-06-07 LAB
ALBUMIN SERPL-MCNC: 4.4 G/DL (ref 3.5–5.2)
ALBUMIN/GLOB SERPL: 2 G/DL
ALP SERPL-CCNC: 38 U/L (ref 39–117)
ALT SERPL-CCNC: 18 U/L (ref 1–41)
AST SERPL-CCNC: 21 U/L (ref 1–40)
BASOPHILS # BLD AUTO: 0.04 10*3/MM3 (ref 0–0.2)
BASOPHILS NFR BLD AUTO: 1 % (ref 0–1.5)
BILIRUB SERPL-MCNC: 1.5 MG/DL (ref 0.2–1.2)
BUN SERPL-MCNC: 18 MG/DL (ref 6–20)
BUN/CREAT SERPL: 12.1 (ref 7–25)
CALCIUM SERPL-MCNC: 9.5 MG/DL (ref 8.6–10.5)
CHLORIDE SERPL-SCNC: 106 MMOL/L (ref 98–107)
CHOLEST SERPL-MCNC: 164 MG/DL (ref 0–200)
CO2 SERPL-SCNC: 28.3 MMOL/L (ref 22–29)
CREAT SERPL-MCNC: 1.49 MG/DL (ref 0.76–1.27)
EOSINOPHIL # BLD AUTO: 0.35 10*3/MM3 (ref 0–0.4)
EOSINOPHIL NFR BLD AUTO: 8.4 % (ref 0.3–6.2)
ERYTHROCYTE [DISTWIDTH] IN BLOOD BY AUTOMATED COUNT: 12.6 % (ref 12.3–15.4)
GLOBULIN SER CALC-MCNC: 2.2 GM/DL
GLUCOSE SERPL-MCNC: 97 MG/DL (ref 65–99)
HCT VFR BLD AUTO: 46 % (ref 37.5–51)
HDLC SERPL-MCNC: 58 MG/DL (ref 40–60)
HGB BLD-MCNC: 15.2 G/DL (ref 13–17.7)
IMM GRANULOCYTES # BLD AUTO: 0.01 10*3/MM3 (ref 0–0.05)
IMM GRANULOCYTES NFR BLD AUTO: 0.2 % (ref 0–0.5)
LDLC SERPL CALC-MCNC: 91 MG/DL (ref 0–100)
LDLC/HDLC SERPL: 1.57 {RATIO}
LYMPHOCYTES # BLD AUTO: 1.86 10*3/MM3 (ref 0.7–3.1)
LYMPHOCYTES NFR BLD AUTO: 44.5 % (ref 19.6–45.3)
MCH RBC QN AUTO: 32.7 PG (ref 26.6–33)
MCHC RBC AUTO-ENTMCNC: 33 G/DL (ref 31.5–35.7)
MCV RBC AUTO: 98.9 FL (ref 79–97)
MONOCYTES # BLD AUTO: 0.38 10*3/MM3 (ref 0.1–0.9)
MONOCYTES NFR BLD AUTO: 9.1 % (ref 5–12)
NEUTROPHILS # BLD AUTO: 1.54 10*3/MM3 (ref 1.7–7)
NEUTROPHILS NFR BLD AUTO: 36.8 % (ref 42.7–76)
NRBC BLD AUTO-RTO: 0 /100 WBC (ref 0–0.2)
PLATELET # BLD AUTO: 175 10*3/MM3 (ref 140–450)
POTASSIUM SERPL-SCNC: 4.6 MMOL/L (ref 3.5–5.2)
PROT SERPL-MCNC: 6.6 G/DL (ref 6–8.5)
PSA SERPL-MCNC: 0.82 NG/ML (ref 0–4)
RBC # BLD AUTO: 4.65 10*6/MM3 (ref 4.14–5.8)
SODIUM SERPL-SCNC: 142 MMOL/L (ref 136–145)
T4 FREE SERPL-MCNC: 1.15 NG/DL (ref 0.93–1.7)
TRIGL SERPL-MCNC: 74 MG/DL (ref 0–150)
TSH SERPL DL<=0.005 MIU/L-ACNC: 7.47 MIU/ML (ref 0.27–4.2)
VLDLC SERPL CALC-MCNC: 14.8 MG/DL
WBC # BLD AUTO: 4.18 10*3/MM3 (ref 3.4–10.8)

## 2019-06-10 NOTE — TELEPHONE ENCOUNTER
I have reviewed these results and they will be discussed  with him  during his upcoming appointment.

## 2019-06-12 ENCOUNTER — OFFICE VISIT (OUTPATIENT)
Dept: FAMILY MEDICINE CLINIC | Facility: CLINIC | Age: 54
End: 2019-06-12

## 2019-06-12 VITALS
OXYGEN SATURATION: 98 % | WEIGHT: 214 LBS | BODY MASS INDEX: 26.61 KG/M2 | HEIGHT: 75 IN | HEART RATE: 80 BPM | SYSTOLIC BLOOD PRESSURE: 120 MMHG | DIASTOLIC BLOOD PRESSURE: 80 MMHG | RESPIRATION RATE: 16 BRPM

## 2019-06-12 DIAGNOSIS — R79.89 ELEVATED SERUM CREATININE: Primary | ICD-10-CM

## 2019-06-12 DIAGNOSIS — E78.00 PURE HYPERCHOLESTEROLEMIA: ICD-10-CM

## 2019-06-12 DIAGNOSIS — E03.9 ACQUIRED HYPOTHYROIDISM: ICD-10-CM

## 2019-06-12 DIAGNOSIS — K21.9 GASTROESOPHAGEAL REFLUX DISEASE WITHOUT ESOPHAGITIS: ICD-10-CM

## 2019-06-12 PROCEDURE — 99214 OFFICE O/P EST MOD 30 MIN: CPT | Performed by: NURSE PRACTITIONER

## 2019-06-12 RX ORDER — ESOMEPRAZOLE MAGNESIUM 40 MG/1
40 CAPSULE, DELAYED RELEASE ORAL
Qty: 90 CAPSULE | Refills: 1 | Status: SHIPPED | OUTPATIENT
Start: 2019-06-12 | End: 2019-12-03 | Stop reason: ALTCHOICE

## 2019-06-12 RX ORDER — LEVOTHYROXINE SODIUM 88 UG/1
88 TABLET ORAL DAILY
Qty: 90 TABLET | Refills: 1 | Status: SHIPPED | OUTPATIENT
Start: 2019-06-12 | End: 2019-12-03 | Stop reason: SDUPTHER

## 2019-06-12 RX ORDER — PRAVASTATIN SODIUM 40 MG
40 TABLET ORAL NIGHTLY
Qty: 90 TABLET | Refills: 1 | Status: SHIPPED | OUTPATIENT
Start: 2019-06-12 | End: 2019-12-03 | Stop reason: SDUPTHER

## 2019-06-12 NOTE — PROGRESS NOTES
Subjective   Rafita Gary is a 54 y.o. male.     History of Present Illness   Rafita Gary 54 y.o. male who presents today for routine follow up check and medication refills.  he has a history of   Patient Active Problem List   Diagnosis   • Acquired hypothyroidism   • Gastroesophageal reflux disease without esophagitis   • Pure hypercholesterolemia   • Raynaud's disease   • Total bilirubin, elevated   .  Since the last visit, he has overall felt well.  He has Hypertenision and is well controlled on medication, Hyperlipidemia and is well controlled on medication and Hypothyroidism and is well controlled on Rx.  Labs are in desired treatment range.  he has been compliant with current medications have reviewed them.  The patient denies medication side effects.    Results for orders placed or performed in visit on 06/06/19   Comprehensive metabolic panel   Result Value Ref Range    Glucose 97 65 - 99 mg/dL    BUN 18 6 - 20 mg/dL    Creatinine 1.49 (H) 0.76 - 1.27 mg/dL    eGFR Non African Am 49 (L) >60 mL/min/1.73    eGFR African Am 59 (L) >60 mL/min/1.73    BUN/Creatinine Ratio 12.1 7.0 - 25.0    Sodium 142 136 - 145 mmol/L    Potassium 4.6 3.5 - 5.2 mmol/L    Chloride 106 98 - 107 mmol/L    Total CO2 28.3 22.0 - 29.0 mmol/L    Calcium 9.5 8.6 - 10.5 mg/dL    Total Protein 6.6 6.0 - 8.5 g/dL    Albumin 4.40 3.50 - 5.20 g/dL    Globulin 2.2 gm/dL    A/G Ratio 2.0 g/dL    Total Bilirubin 1.5 (H) 0.2 - 1.2 mg/dL    Alkaline Phosphatase 38 (L) 39 - 117 U/L    AST (SGOT) 21 1 - 40 U/L    ALT (SGPT) 18 1 - 41 U/L   Lipid Panel With LDL/HDL Ratio   Result Value Ref Range    Total Cholesterol 164 0 - 200 mg/dL    Triglycerides 74 0 - 150 mg/dL    HDL Cholesterol 58 40 - 60 mg/dL    VLDL Cholesterol 14.8 mg/dL    LDL Cholesterol  91 0 - 100 mg/dL    LDL/HDL Ratio 1.57    TSH   Result Value Ref Range    TSH 7.470 (H) 0.270 - 4.200 mIU/mL   T4, Free   Result Value Ref Range    Free T4 1.15 0.93 - 1.70 ng/dL   PSA  DIAGNOSTIC   Result Value Ref Range    PSA 0.821 0.000 - 4.000 ng/mL   CBC and Differential   Result Value Ref Range    WBC 4.18 3.40 - 10.80 10*3/mm3    RBC 4.65 4.14 - 5.80 10*6/mm3    Hemoglobin 15.2 13.0 - 17.7 g/dL    Hematocrit 46.0 37.5 - 51.0 %    MCV 98.9 (H) 79.0 - 97.0 fL    MCH 32.7 26.6 - 33.0 pg    MCHC 33.0 31.5 - 35.7 g/dL    RDW 12.6 12.3 - 15.4 %    Platelets 175 140 - 450 10*3/mm3    Neutrophil Rel % 36.8 (L) 42.7 - 76.0 %    Lymphocyte Rel % 44.5 19.6 - 45.3 %    Monocyte Rel % 9.1 5.0 - 12.0 %    Eosinophil Rel % 8.4 (H) 0.3 - 6.2 %    Basophil Rel % 1.0 0.0 - 1.5 %    Neutrophils Absolute 1.54 (L) 1.70 - 7.00 10*3/mm3    Lymphocytes Absolute 1.86 0.70 - 3.10 10*3/mm3    Monocytes Absolute 0.38 0.10 - 0.90 10*3/mm3    Eosinophils Absolute 0.35 0.00 - 0.40 10*3/mm3    Basophils Absolute 0.04 0.00 - 0.20 10*3/mm3    Immature Granulocyte Rel % 0.2 0.0 - 0.5 %    Immature Grans Absolute 0.01 0.00 - 0.05 10*3/mm3    nRBC 0.0 0.0 - 0.2 /100 WBC       Labs reviewed with pt today during visit. All questions answered.  Discussed decreased renal function. Patient has been taking ibuprofen regularly for his back.  He also states he has been drinking more soda than water.    The following portions of the patient's history were reviewed and updated as appropriate: allergies, current medications, past family history, past medical history, past social history, past surgical history and problem list.    Review of Systems   Constitutional: Negative for fatigue.   Respiratory: Negative for cough and shortness of breath.    Cardiovascular: Negative for chest pain and palpitations.   Endocrine: Negative for cold intolerance, heat intolerance, polydipsia, polyphagia and polyuria.   Skin: Negative for rash.   Psychiatric/Behavioral: Negative for dysphoric mood and sleep disturbance. The patient is not nervous/anxious.        Objective   Physical Exam   Constitutional: He is oriented to person, place, and time. He  appears well-developed and well-nourished.   Cardiovascular: Normal rate and regular rhythm.   Pulmonary/Chest: Effort normal and breath sounds normal.   Neurological: He is alert and oriented to person, place, and time.   Skin: Skin is warm and dry.   Psychiatric: He has a normal mood and affect. His behavior is normal. Judgment and thought content normal.   Nursing note and vitals reviewed.      Assessment/Plan   Rafita was seen today for med refill, heartburn, hypothyroidism and hyperlipidemia.    Diagnoses and all orders for this visit:    Elevated serum creatinine  -     Basic metabolic panel    Pure hypercholesterolemia  -     pravastatin (PRAVACHOL) 40 MG tablet; Take 1 tablet by mouth Every Night.    Acquired hypothyroidism  -     levothyroxine (SYNTHROID) 88 MCG tablet; Take 1 tablet by mouth Daily for 180 days.    Gastroesophageal reflux disease without esophagitis  -     esomeprazole (NEXIUM) 40 MG capsule; Take 1 capsule by mouth Every Morning Before Breakfast.      Patient will stop NSAIDs and use Tylenol.  He will increase water intake. Repeat BMP in 1 week.

## 2019-06-22 LAB
BUN SERPL-MCNC: 16 MG/DL (ref 6–20)
BUN/CREAT SERPL: 9.9 (ref 7–25)
CALCIUM SERPL-MCNC: 9 MG/DL (ref 8.6–10.5)
CHLORIDE SERPL-SCNC: 104 MMOL/L (ref 98–107)
CO2 SERPL-SCNC: 28.5 MMOL/L (ref 22–29)
CREAT SERPL-MCNC: 1.62 MG/DL (ref 0.76–1.27)
GLUCOSE SERPL-MCNC: 125 MG/DL (ref 65–99)
POTASSIUM SERPL-SCNC: 4.8 MMOL/L (ref 3.5–5.2)
SODIUM SERPL-SCNC: 144 MMOL/L (ref 136–145)

## 2019-06-24 DIAGNOSIS — N28.9 LOW KIDNEY FUNCTION: Primary | ICD-10-CM

## 2019-09-24 ENCOUNTER — TRANSCRIBE ORDERS (OUTPATIENT)
Dept: ADMINISTRATIVE | Facility: HOSPITAL | Age: 54
End: 2019-09-24

## 2019-09-24 DIAGNOSIS — N17.9 ACUTE RENAL FAILURE, UNSPECIFIED ACUTE RENAL FAILURE TYPE (HCC): Primary | ICD-10-CM

## 2019-10-10 VITALS
SYSTOLIC BLOOD PRESSURE: 98 MMHG | DIASTOLIC BLOOD PRESSURE: 73 MMHG | DIASTOLIC BLOOD PRESSURE: 84 MMHG | RESPIRATION RATE: 16 BRPM | SYSTOLIC BLOOD PRESSURE: 100 MMHG | DIASTOLIC BLOOD PRESSURE: 88 MMHG | HEART RATE: 90 BPM | HEART RATE: 88 BPM | RESPIRATION RATE: 15 BRPM | HEART RATE: 76 BPM | TEMPERATURE: 98 F | DIASTOLIC BLOOD PRESSURE: 68 MMHG | OXYGEN SATURATION: 97 % | SYSTOLIC BLOOD PRESSURE: 96 MMHG | HEART RATE: 82 BPM | RESPIRATION RATE: 14 BRPM | OXYGEN SATURATION: 98 % | HEIGHT: 75 IN | OXYGEN SATURATION: 94 % | DIASTOLIC BLOOD PRESSURE: 63 MMHG | DIASTOLIC BLOOD PRESSURE: 67 MMHG | SYSTOLIC BLOOD PRESSURE: 125 MMHG | DIASTOLIC BLOOD PRESSURE: 65 MMHG | SYSTOLIC BLOOD PRESSURE: 106 MMHG | WEIGHT: 210 LBS | SYSTOLIC BLOOD PRESSURE: 142 MMHG | RESPIRATION RATE: 17 BRPM | TEMPERATURE: 97.2 F | HEART RATE: 72 BPM | OXYGEN SATURATION: 95 % | HEART RATE: 84 BPM | HEART RATE: 78 BPM

## 2019-10-14 ENCOUNTER — OFFICE (AMBULATORY)
Dept: URBAN - METROPOLITAN AREA PATHOLOGY 4 | Facility: PATHOLOGY | Age: 54
End: 2019-10-14
Payer: COMMERCIAL

## 2019-10-14 ENCOUNTER — AMBULATORY SURGICAL CENTER (AMBULATORY)
Dept: URBAN - METROPOLITAN AREA SURGERY 17 | Facility: SURGERY | Age: 54
End: 2019-10-14
Payer: COMMERCIAL

## 2019-10-14 DIAGNOSIS — K62.1 RECTAL POLYP: ICD-10-CM

## 2019-10-14 DIAGNOSIS — Z86.010 PERSONAL HISTORY OF COLONIC POLYPS: ICD-10-CM

## 2019-10-14 LAB
GI HISTOLOGY: A. UNSPECIFIED: (no result)
GI HISTOLOGY: PDF REPORT: (no result)

## 2019-10-14 PROCEDURE — 45380 COLONOSCOPY AND BIOPSY: CPT | Mod: 33 | Performed by: INTERNAL MEDICINE

## 2019-10-14 PROCEDURE — 88305 TISSUE EXAM BY PATHOLOGIST: CPT | Mod: 33 | Performed by: INTERNAL MEDICINE

## 2019-11-01 ENCOUNTER — HOSPITAL ENCOUNTER (OUTPATIENT)
Dept: ULTRASOUND IMAGING | Facility: HOSPITAL | Age: 54
Discharge: HOME | End: 2019-11-01
Admitting: INTERNAL MEDICINE

## 2019-11-01 DIAGNOSIS — N17.9 ACUTE RENAL FAILURE, UNSPECIFIED ACUTE RENAL FAILURE TYPE (HCC): ICD-10-CM

## 2019-11-01 PROCEDURE — 76775 US EXAM ABDO BACK WALL LIM: CPT

## 2019-11-26 ENCOUNTER — TELEPHONE (OUTPATIENT)
Dept: FAMILY MEDICINE CLINIC | Facility: CLINIC | Age: 54
End: 2019-11-26

## 2019-11-26 DIAGNOSIS — E03.9 ACQUIRED HYPOTHYROIDISM: ICD-10-CM

## 2019-11-26 DIAGNOSIS — E78.00 PURE HYPERCHOLESTEROLEMIA: Primary | ICD-10-CM

## 2019-11-28 LAB
ALBUMIN SERPL-MCNC: 4.4 G/DL (ref 3.5–5.5)
ALBUMIN/GLOB SERPL: 2 {RATIO} (ref 1.2–2.2)
ALP SERPL-CCNC: 45 IU/L (ref 39–117)
ALT SERPL-CCNC: 15 IU/L (ref 0–44)
AST SERPL-CCNC: 21 IU/L (ref 0–40)
BASOPHILS # BLD AUTO: 0 X10E3/UL (ref 0–0.2)
BASOPHILS NFR BLD AUTO: 1 %
BILIRUB SERPL-MCNC: 1 MG/DL (ref 0–1.2)
BUN SERPL-MCNC: 20 MG/DL (ref 6–24)
BUN/CREAT SERPL: 15 (ref 9–20)
CALCIUM SERPL-MCNC: 9 MG/DL (ref 8.7–10.2)
CHLORIDE SERPL-SCNC: 104 MMOL/L (ref 96–106)
CHOLEST SERPL-MCNC: 152 MG/DL (ref 100–199)
CO2 SERPL-SCNC: 22 MMOL/L (ref 20–29)
CREAT SERPL-MCNC: 1.34 MG/DL (ref 0.76–1.27)
EOSINOPHIL # BLD AUTO: 0.2 X10E3/UL (ref 0–0.4)
EOSINOPHIL NFR BLD AUTO: 4 %
ERYTHROCYTE [DISTWIDTH] IN BLOOD BY AUTOMATED COUNT: 13.2 % (ref 12.3–15.4)
GLOBULIN SER CALC-MCNC: 2.2 G/DL (ref 1.5–4.5)
GLUCOSE SERPL-MCNC: 99 MG/DL (ref 65–99)
HCT VFR BLD AUTO: 42 % (ref 37.5–51)
HDLC SERPL-MCNC: 54 MG/DL
HGB BLD-MCNC: 14.2 G/DL (ref 13–17.7)
IMM GRANULOCYTES # BLD AUTO: 0 X10E3/UL (ref 0–0.1)
IMM GRANULOCYTES NFR BLD AUTO: 0 %
LDLC SERPL CALC-MCNC: 82 MG/DL (ref 0–99)
LDLC/HDLC SERPL: 1.5 RATIO (ref 0–3.6)
LYMPHOCYTES # BLD AUTO: 1.2 X10E3/UL (ref 0.7–3.1)
LYMPHOCYTES NFR BLD AUTO: 34 %
MCH RBC QN AUTO: 32.6 PG (ref 26.6–33)
MCHC RBC AUTO-ENTMCNC: 33.8 G/DL (ref 31.5–35.7)
MCV RBC AUTO: 96 FL (ref 79–97)
MONOCYTES # BLD AUTO: 0.4 X10E3/UL (ref 0.1–0.9)
MONOCYTES NFR BLD AUTO: 10 %
NEUTROPHILS # BLD AUTO: 1.8 X10E3/UL (ref 1.4–7)
NEUTROPHILS NFR BLD AUTO: 51 %
PLATELET # BLD AUTO: 198 X10E3/UL (ref 150–450)
POTASSIUM SERPL-SCNC: 4.6 MMOL/L (ref 3.5–5.2)
PROT SERPL-MCNC: 6.6 G/DL (ref 6–8.5)
RBC # BLD AUTO: 4.36 X10E6/UL (ref 4.14–5.8)
SODIUM SERPL-SCNC: 142 MMOL/L (ref 134–144)
T4 FREE SERPL-MCNC: 1.16 NG/DL (ref 0.82–1.77)
TRIGL SERPL-MCNC: 82 MG/DL (ref 0–149)
TSH SERPL DL<=0.005 MIU/L-ACNC: 5.1 UIU/ML (ref 0.45–4.5)
VLDLC SERPL CALC-MCNC: 16 MG/DL (ref 5–40)
WBC # BLD AUTO: 3.6 X10E3/UL (ref 3.4–10.8)

## 2019-12-03 ENCOUNTER — OFFICE VISIT (OUTPATIENT)
Dept: FAMILY MEDICINE CLINIC | Facility: CLINIC | Age: 54
End: 2019-12-03

## 2019-12-03 VITALS
BODY MASS INDEX: 26.61 KG/M2 | WEIGHT: 214 LBS | OXYGEN SATURATION: 98 % | SYSTOLIC BLOOD PRESSURE: 109 MMHG | DIASTOLIC BLOOD PRESSURE: 72 MMHG | HEIGHT: 75 IN | HEART RATE: 67 BPM | RESPIRATION RATE: 16 BRPM

## 2019-12-03 DIAGNOSIS — K21.9 GASTROESOPHAGEAL REFLUX DISEASE WITHOUT ESOPHAGITIS: ICD-10-CM

## 2019-12-03 DIAGNOSIS — E78.00 PURE HYPERCHOLESTEROLEMIA: ICD-10-CM

## 2019-12-03 DIAGNOSIS — E03.9 ACQUIRED HYPOTHYROIDISM: Primary | ICD-10-CM

## 2019-12-03 DIAGNOSIS — R35.1 NOCTURIA: ICD-10-CM

## 2019-12-03 DIAGNOSIS — M79.89 SWELLING OF TOE OF LEFT FOOT: ICD-10-CM

## 2019-12-03 DIAGNOSIS — Z23 NEED FOR IMMUNIZATION AGAINST INFLUENZA: Primary | ICD-10-CM

## 2019-12-03 PROBLEM — R79.89 OTHER SPECIFIED ABNORMAL FINDINGS OF BLOOD CHEMISTRY: Status: ACTIVE | Noted: 2019-08-19

## 2019-12-03 PROBLEM — R79.89 OTHER SPECIFIED ABNORMAL FINDINGS OF BLOOD CHEMISTRY: Status: RESOLVED | Noted: 2019-08-19 | Resolved: 2019-12-03

## 2019-12-03 PROCEDURE — 99214 OFFICE O/P EST MOD 30 MIN: CPT | Performed by: FAMILY MEDICINE

## 2019-12-03 PROCEDURE — 90471 IMMUNIZATION ADMIN: CPT | Performed by: FAMILY MEDICINE

## 2019-12-03 PROCEDURE — 90674 CCIIV4 VAC NO PRSV 0.5 ML IM: CPT | Performed by: FAMILY MEDICINE

## 2019-12-03 RX ORDER — PRAVASTATIN SODIUM 40 MG
40 TABLET ORAL NIGHTLY
Qty: 90 TABLET | Refills: 1 | Status: SHIPPED | OUTPATIENT
Start: 2019-12-03 | End: 2020-06-01 | Stop reason: SDUPTHER

## 2019-12-03 RX ORDER — PANTOPRAZOLE SODIUM 20 MG/1
20 TABLET, DELAYED RELEASE ORAL NIGHTLY
Qty: 90 TABLET | Refills: 1 | Status: SHIPPED | OUTPATIENT
Start: 2019-12-03 | End: 2019-12-29 | Stop reason: SDUPTHER

## 2019-12-03 RX ORDER — LEVOTHYROXINE SODIUM 0.1 MG/1
100 TABLET ORAL DAILY
Qty: 90 TABLET | Refills: 1 | Status: SHIPPED | OUTPATIENT
Start: 2019-12-03 | End: 2020-06-01 | Stop reason: SDUPTHER

## 2019-12-03 NOTE — ASSESSMENT & PLAN NOTE
Change to low-dose pantoprazole 20 mg at bedtime.  Patient will relay this information to his kidney specialist.  If kidney specialist would like us to switch then patient will contact us for that consideration.

## 2019-12-03 NOTE — PROGRESS NOTES
"   Subjective       Chief Complaint   Patient presents with   • Hypothyroidism   • Hyperlipidemia   • Med Management         History of Present Illness   Rafita Gary is a 54 y.o. male who presents to the office today to review labs and refill medications.  Thyroid is improved but not quite to goal.  GERD symptoms are stable with Nexium but he needs to switch because of insurance considerations.  Previously he tolerated pantoprazole.  Another consideration is his kidney dysfunction.  He has an appointment with his kidney specialist on Friday of this week.  We talked about proton pump inhibitors.  We will switch to low-dose pantoprazole for this reason.  Lipids are controlled on current therapy.  Patient has had some swelling without pain of his left second and third toes.  Please see photo documentation below.  (Suspect thyroid dysfunction)    I have reviewed and updated his medications, medical history and problem list during today's office visit.     Social History     Tobacco Use   • Smoking status: Former Smoker     Packs/day: 0.50     Years: 15.00     Pack years: 7.50     Start date: 1973     Last attempt to quit: 1988     Years since quittin.8   • Smokeless tobacco: Never Used   Substance Use Topics   • Alcohol use: No       Review of Systems   Constitutional: Negative for fatigue.   Cardiovascular: Negative for chest pain.   Skin:        See history of present illness         Physical Examination:   Objective   /72   Pulse 67   Resp 16   Ht 190.5 cm (75\")   Wt 97.1 kg (214 lb)   SpO2 98%   BMI 26.75 kg/m²     Body mass index is 26.75 kg/m².    Physical Exam   Constitutional: He is oriented to person, place, and time. He appears well-developed. No distress.   Eyes: Conjunctivae and lids are normal.   Neck: Trachea normal and phonation normal. No tracheal tenderness present. Carotid bruit is not present. No thyroid mass and no thyromegaly present.   Cardiovascular: Normal rate, " regular rhythm and normal heart sounds.   Pulmonary/Chest: Effort normal and breath sounds normal.   Neurological: He is alert and oriented to person, place, and time.   Skin: Skin is warm and dry.   Psychiatric: He has a normal mood and affect. His behavior is normal.   Vitals reviewed.      Non tender swelling of left 2nd, 3rd toes since September 2019(no injury)   Data Reviewed:              Lab Results   Component Value Date    GLU 99 11/27/2019    BUN 20 11/27/2019    CREATININE 1.34 (H) 11/27/2019    EGFRIFNONA 59 (L) 11/27/2019    EGFRIFAFRI 68 11/27/2019     11/27/2019    K 4.6 11/27/2019     11/27/2019    CALCIUM 9.0 11/27/2019    ALBUMIN 4.4 11/27/2019    BILITOT 1.0 11/27/2019    ALKPHOS 45 11/27/2019    AST 21 11/27/2019    ALT 15 11/27/2019    CHLPL 152 11/27/2019    TRIG 82 11/27/2019    HDL 54 11/27/2019    VLDL 16 11/27/2019    LDL 82 11/27/2019    LDLHDL 1.5 11/27/2019    WBC 3.6 11/27/2019    RBC 4.36 11/27/2019    HCT 42.0 11/27/2019    MCV 96 11/27/2019    MCH 32.6 11/27/2019    TSH 5.100 (H) 11/27/2019    FREET4 1.16 11/27/2019          Assessment/Plan:   Assessment/Plan   Diagnoses and all orders for this visit:    1. Acquired hypothyroidism (Primary)  Assessment & Plan:  Increase thyroid dose to 100 mcg daily.  Recheck next visit    Orders:  -     levothyroxine (SYNTHROID, LEVOTHROID) 100 MCG tablet; Take 1 tablet by mouth Daily for 180 days.  Dispense: 90 tablet; Refill: 1  -     TSH+Free T4; Future    2. Gastroesophageal reflux disease without esophagitis  Assessment & Plan:  Change to low-dose pantoprazole 20 mg at bedtime.  Patient will relay this information to his kidney specialist.  If kidney specialist would like us to switch then patient will contact us for that consideration.    Orders:  -     pantoprazole (PROTONIX) 20 MG EC tablet; Take 1 tablet by mouth Every Night for 180 days.  Dispense: 90 tablet; Refill: 1  -     Comprehensive Metabolic Panel; Future  -     CBC &  Differential; Future    3. Pure hypercholesterolemia  Assessment & Plan:  Lipid abnormalities are unchanged.  Pharmacotherapy as ordered.  Lipids will be reassessed in 6 months.    Orders:  -     pravastatin (PRAVACHOL) 40 MG tablet; Take 1 tablet by mouth Every Night.  Dispense: 90 tablet; Refill: 1  -     Comprehensive Metabolic Panel; Future  -     CBC & Differential; Future  -     Lipid Panel With / Chol / HDL Ratio; Future  -     CK; Future    4. Swelling of 2nd and 3rd toes of left foot  Assessment & Plan:  Observation while controlling thyroid.       5. Nocturia  Assessment & Plan:  Check PSA next visit.    Orders:  -     PSA DIAGNOSTIC; Future    Patient Instructions         Follow up:   No Follow-up on file.

## 2019-12-23 ENCOUNTER — PATIENT MESSAGE (OUTPATIENT)
Dept: FAMILY MEDICINE CLINIC | Facility: CLINIC | Age: 54
End: 2019-12-23

## 2019-12-23 DIAGNOSIS — K21.9 GASTROESOPHAGEAL REFLUX DISEASE WITHOUT ESOPHAGITIS: ICD-10-CM

## 2019-12-29 RX ORDER — PANTOPRAZOLE SODIUM 40 MG/1
40 TABLET, DELAYED RELEASE ORAL NIGHTLY
Qty: 90 TABLET | Refills: 1 | Status: SHIPPED | OUTPATIENT
Start: 2019-12-29 | End: 2020-06-01 | Stop reason: SDUPTHER

## 2019-12-30 NOTE — TELEPHONE ENCOUNTER
Kelly Chong DANNIELLE 12/23/2019 7:37 AM EST        ----- Message -----  From: Rafita Gary  Sent: 12/23/2019 2:49 AM EST  To: [facility]  Subject: Visit Follow-Up Question     Dr Lancaster, you changed my acid reflux medicine on 12/3 because my insurance wouldn’t cover the old medication. You told me to let you know if it works or not. The new one I am taking now is not working at all. You changed from 40 mg to 20 mg because of my kidney function. Dr Valdovinos said it doesn’t matter if I stay on the higher dose medicine. Can you please switch me back to a higher dose?   Please let me know if this is possible.  Thank you, Jamie

## 2020-05-01 ENCOUNTER — RESULTS ENCOUNTER (OUTPATIENT)
Dept: FAMILY MEDICINE CLINIC | Facility: CLINIC | Age: 55
End: 2020-05-01

## 2020-05-01 DIAGNOSIS — E03.9 ACQUIRED HYPOTHYROIDISM: ICD-10-CM

## 2020-05-01 DIAGNOSIS — E78.00 PURE HYPERCHOLESTEROLEMIA: ICD-10-CM

## 2020-05-01 DIAGNOSIS — K21.9 GASTROESOPHAGEAL REFLUX DISEASE WITHOUT ESOPHAGITIS: ICD-10-CM

## 2020-05-01 DIAGNOSIS — R35.1 NOCTURIA: ICD-10-CM

## 2020-05-29 LAB
ALBUMIN SERPL-MCNC: 4.5 G/DL (ref 3.5–5.2)
ALBUMIN/GLOB SERPL: 2.4 G/DL
ALP SERPL-CCNC: 35 U/L (ref 39–117)
ALT SERPL-CCNC: 17 U/L (ref 1–41)
AST SERPL-CCNC: 21 U/L (ref 1–40)
BASOPHILS # BLD AUTO: 0.05 10*3/MM3 (ref 0–0.2)
BASOPHILS NFR BLD AUTO: 1 % (ref 0–1.5)
BILIRUB SERPL-MCNC: 1.7 MG/DL (ref 0.2–1.2)
BUN SERPL-MCNC: 16 MG/DL (ref 6–20)
BUN/CREAT SERPL: 12.4 (ref 7–25)
CALCIUM SERPL-MCNC: 8.9 MG/DL (ref 8.6–10.5)
CHLORIDE SERPL-SCNC: 104 MMOL/L (ref 98–107)
CHOLEST SERPL-MCNC: 130 MG/DL (ref 0–200)
CHOLEST/HDLC SERPL: 2.65 {RATIO}
CK SERPL-CCNC: 180 U/L (ref 20–200)
CO2 SERPL-SCNC: 29.4 MMOL/L (ref 22–29)
CREAT SERPL-MCNC: 1.29 MG/DL (ref 0.76–1.27)
EOSINOPHIL # BLD AUTO: 0.24 10*3/MM3 (ref 0–0.4)
EOSINOPHIL NFR BLD AUTO: 4.8 % (ref 0.3–6.2)
ERYTHROCYTE [DISTWIDTH] IN BLOOD BY AUTOMATED COUNT: 13 % (ref 12.3–15.4)
GLOBULIN SER CALC-MCNC: 1.9 GM/DL
GLUCOSE SERPL-MCNC: 87 MG/DL (ref 65–99)
HCT VFR BLD AUTO: 41.4 % (ref 37.5–51)
HDLC SERPL-MCNC: 49 MG/DL (ref 40–60)
HGB BLD-MCNC: 14.4 G/DL (ref 13–17.7)
IMM GRANULOCYTES # BLD AUTO: 0.01 10*3/MM3 (ref 0–0.05)
IMM GRANULOCYTES NFR BLD AUTO: 0.2 % (ref 0–0.5)
LDLC SERPL CALC-MCNC: 68 MG/DL (ref 0–100)
LYMPHOCYTES # BLD AUTO: 1.75 10*3/MM3 (ref 0.7–3.1)
LYMPHOCYTES NFR BLD AUTO: 35 % (ref 19.6–45.3)
MCH RBC QN AUTO: 33.6 PG (ref 26.6–33)
MCHC RBC AUTO-ENTMCNC: 34.8 G/DL (ref 31.5–35.7)
MCV RBC AUTO: 96.7 FL (ref 79–97)
MONOCYTES # BLD AUTO: 0.48 10*3/MM3 (ref 0.1–0.9)
MONOCYTES NFR BLD AUTO: 9.6 % (ref 5–12)
NEUTROPHILS # BLD AUTO: 2.47 10*3/MM3 (ref 1.7–7)
NEUTROPHILS NFR BLD AUTO: 49.4 % (ref 42.7–76)
NRBC BLD AUTO-RTO: 0 /100 WBC (ref 0–0.2)
PLATELET # BLD AUTO: 191 10*3/MM3 (ref 140–450)
POTASSIUM SERPL-SCNC: 3.9 MMOL/L (ref 3.5–5.2)
PROT SERPL-MCNC: 6.4 G/DL (ref 6–8.5)
PSA SERPL-MCNC: 0.8 NG/ML (ref 0–4)
RBC # BLD AUTO: 4.28 10*6/MM3 (ref 4.14–5.8)
SODIUM SERPL-SCNC: 141 MMOL/L (ref 136–145)
T4 FREE SERPL-MCNC: 1.45 NG/DL (ref 0.93–1.7)
TRIGL SERPL-MCNC: 64 MG/DL (ref 0–150)
TSH SERPL DL<=0.005 MIU/L-ACNC: 2.4 UIU/ML (ref 0.27–4.2)
VLDLC SERPL CALC-MCNC: 12.8 MG/DL (ref 5–40)
WBC # BLD AUTO: 5 10*3/MM3 (ref 3.4–10.8)

## 2020-06-01 ENCOUNTER — OFFICE VISIT (OUTPATIENT)
Dept: FAMILY MEDICINE CLINIC | Facility: CLINIC | Age: 55
End: 2020-06-01

## 2020-06-01 VITALS
RESPIRATION RATE: 16 BRPM | HEART RATE: 74 BPM | SYSTOLIC BLOOD PRESSURE: 109 MMHG | WEIGHT: 215 LBS | BODY MASS INDEX: 26.73 KG/M2 | DIASTOLIC BLOOD PRESSURE: 72 MMHG | OXYGEN SATURATION: 98 % | HEIGHT: 75 IN | TEMPERATURE: 97.7 F

## 2020-06-01 DIAGNOSIS — R35.1 NOCTURIA: ICD-10-CM

## 2020-06-01 DIAGNOSIS — N18.30 STAGE 3 CHRONIC KIDNEY DISEASE (HCC): ICD-10-CM

## 2020-06-01 DIAGNOSIS — E03.9 ACQUIRED HYPOTHYROIDISM: ICD-10-CM

## 2020-06-01 DIAGNOSIS — E78.00 PURE HYPERCHOLESTEROLEMIA: Primary | ICD-10-CM

## 2020-06-01 DIAGNOSIS — K21.9 GASTROESOPHAGEAL REFLUX DISEASE WITHOUT ESOPHAGITIS: ICD-10-CM

## 2020-06-01 PROCEDURE — 99214 OFFICE O/P EST MOD 30 MIN: CPT | Performed by: FAMILY MEDICINE

## 2020-06-01 RX ORDER — PRAVASTATIN SODIUM 40 MG
40 TABLET ORAL NIGHTLY
Qty: 90 TABLET | Refills: 1 | Status: SHIPPED | OUTPATIENT
Start: 2020-06-01 | End: 2020-10-29 | Stop reason: SDUPTHER

## 2020-06-01 RX ORDER — PANTOPRAZOLE SODIUM 40 MG/1
40 TABLET, DELAYED RELEASE ORAL NIGHTLY
Qty: 90 TABLET | Refills: 1 | Status: SHIPPED | OUTPATIENT
Start: 2020-06-01 | End: 2020-10-29 | Stop reason: SDUPTHER

## 2020-06-01 RX ORDER — LEVOTHYROXINE SODIUM 0.1 MG/1
100 TABLET ORAL DAILY
Qty: 90 TABLET | Refills: 1 | Status: SHIPPED | OUTPATIENT
Start: 2020-06-01 | End: 2020-10-29 | Stop reason: SDUPTHER

## 2020-06-01 NOTE — PROGRESS NOTES
"   Subjective       Chief Complaint   Patient presents with   • Hypothyroidism     med refill    • Hyperlipidemia         HPI:       Rafita Gary is a 55 y.o. male who presents to the office today to review labs and refill medicines.  Thyroid has controlled since upping dose of levothyroxine.  GERD symptoms stable on pantoprazole.  Lipids are controlled on current statin medication.  Nocturia symptoms stable.  PSA is stable.  Labs continue to show elevation of total bilirubin.  This condition is totally asymptomatic and longstanding.  Kidney has shown interval improvement.  He remains under the care of nephrologist for chronic kidney disease stage III.    I have reviewed and updated his medications, medical history and problem list during today's office visit.     Social History     Tobacco Use   • Smoking status: Former Smoker     Packs/day: 0.50     Years: 15.00     Pack years: 7.50     Start date: 1973     Last attempt to quit: 1988     Years since quittin.3   • Smokeless tobacco: Never Used   Substance Use Topics   • Alcohol use: No       Review of Systems   Constitutional: Negative for fatigue.   Cardiovascular: Negative for chest pain.         PE:   Objective   /72   Pulse 74   Temp 97.7 °F (36.5 °C) (Oral)   Resp 16   Ht 190.5 cm (75\")   Wt 97.5 kg (215 lb)   SpO2 98%   BMI 26.87 kg/m²     Body mass index is 26.87 kg/m².    Physical Exam   Constitutional: He is oriented to person, place, and time. He appears well-developed. No distress.   Eyes: Conjunctivae and lids are normal.   Neck: Carotid bruit is not present.   Cardiovascular: Normal rate, regular rhythm and normal heart sounds.   Pulmonary/Chest: Effort normal and breath sounds normal.   Neurological: He is alert and oriented to person, place, and time.   Skin: Skin is warm and dry.   Psychiatric: He has a normal mood and affect. His behavior is normal.   Vitals reviewed.       Data Reviewed:                   A/P:   "   Assessment/Plan   Diagnoses and all orders for this visit:    1. Pure hypercholesterolemia (Primary)  Assessment & Plan:  Lipid abnormalities are unchanged.  Pharmacotherapy as ordered.  Lipids will be reassessed in 6 months.    Orders:  -     pravastatin (PRAVACHOL) 40 MG tablet; Take 1 tablet by mouth Every Night.  Dispense: 90 tablet; Refill: 1  -     Comprehensive Metabolic Panel; Future  -     CBC & Differential; Future  -     Lipid Panel With / Chol / HDL Ratio; Future    2. Acquired hypothyroidism  Assessment & Plan:  The current medical regimen is effective;  continue present plan and medications.      Orders:  -     levothyroxine (SYNTHROID, LEVOTHROID) 100 MCG tablet; Take 1 tablet by mouth Daily for 180 days.  Dispense: 90 tablet; Refill: 1  -     TSH; Future    3. Gastroesophageal reflux disease without esophagitis  Assessment & Plan:  The current medical regimen is effective;  continue present plan and medications.      Orders:  -     pantoprazole (PROTONIX) 40 MG EC tablet; Take 1 tablet by mouth Every Night for 180 days.  Dispense: 90 tablet; Refill: 1  -     Comprehensive Metabolic Panel; Future  -     CBC & Differential; Future    4. Nocturia  Assessment & Plan:  Nocturia symptoms are stable. PSA will be monitored with annual labs.         5. Stage 3 chronic kidney disease (CMS/HCC)  Assessment & Plan:  Renal condition is improving with treatment.  Continue current treatment regimen.  Renal condition will be reassessed in 6 months.    Orders:  -     Comprehensive Metabolic Panel; Future        Follow up:    Return in about 6 months (around 12/1/2020) for Recheck/Medication  Refill.

## 2020-10-02 ENCOUNTER — TELEPHONE (OUTPATIENT)
Dept: FAMILY MEDICINE CLINIC | Facility: CLINIC | Age: 55
End: 2020-10-02

## 2020-10-02 NOTE — TELEPHONE ENCOUNTER
Patient is positive for Covid and wife calls about symptoms; now states he is better today; told to go to ER if SOA or any other symptoms worsen over the weekend

## 2020-10-29 ENCOUNTER — OFFICE VISIT (OUTPATIENT)
Dept: FAMILY MEDICINE CLINIC | Facility: CLINIC | Age: 55
End: 2020-10-29

## 2020-10-29 ENCOUNTER — RESULTS ENCOUNTER (OUTPATIENT)
Dept: FAMILY MEDICINE CLINIC | Facility: CLINIC | Age: 55
End: 2020-10-29

## 2020-10-29 VITALS
HEIGHT: 75 IN | WEIGHT: 202 LBS | SYSTOLIC BLOOD PRESSURE: 106 MMHG | RESPIRATION RATE: 16 BRPM | OXYGEN SATURATION: 97 % | DIASTOLIC BLOOD PRESSURE: 67 MMHG | BODY MASS INDEX: 25.12 KG/M2 | TEMPERATURE: 96.9 F | HEART RATE: 91 BPM

## 2020-10-29 DIAGNOSIS — N18.30 STAGE 3 CHRONIC KIDNEY DISEASE (HCC): ICD-10-CM

## 2020-10-29 DIAGNOSIS — E78.00 PURE HYPERCHOLESTEROLEMIA: Primary | ICD-10-CM

## 2020-10-29 DIAGNOSIS — K21.9 GASTROESOPHAGEAL REFLUX DISEASE WITHOUT ESOPHAGITIS: ICD-10-CM

## 2020-10-29 DIAGNOSIS — E78.00 PURE HYPERCHOLESTEROLEMIA: ICD-10-CM

## 2020-10-29 DIAGNOSIS — E03.9 ACQUIRED HYPOTHYROIDISM: ICD-10-CM

## 2020-10-29 DIAGNOSIS — U07.1 COVID-19 VIRUS DETECTED: ICD-10-CM

## 2020-10-29 PROCEDURE — 99214 OFFICE O/P EST MOD 30 MIN: CPT | Performed by: FAMILY MEDICINE

## 2020-10-29 RX ORDER — PANTOPRAZOLE SODIUM 40 MG/1
40 TABLET, DELAYED RELEASE ORAL NIGHTLY
Qty: 90 TABLET | Refills: 1 | Status: SHIPPED | OUTPATIENT
Start: 2020-10-29 | End: 2021-06-08 | Stop reason: SDUPTHER

## 2020-10-29 RX ORDER — LEVOTHYROXINE SODIUM 0.1 MG/1
100 TABLET ORAL DAILY
Qty: 90 TABLET | Refills: 1 | Status: SHIPPED | OUTPATIENT
Start: 2020-10-29 | End: 2021-06-08 | Stop reason: SDUPTHER

## 2020-10-29 RX ORDER — PRAVASTATIN SODIUM 40 MG
40 TABLET ORAL NIGHTLY
Qty: 90 TABLET | Refills: 1 | Status: SHIPPED | OUTPATIENT
Start: 2020-10-29 | End: 2021-06-08 | Stop reason: SDUPTHER

## 2020-10-29 NOTE — PROGRESS NOTES
"   Subjective       Chief Complaint   Patient presents with   • Hypertension     b/p check         SUBJECTIVE:       This patient presents for follow-up of blood pressure.  He was in the urgent care center and they said his blood pressure was borderline elevated.  He was seen at that urgent care center due to COVID-19 infection.  He had the typical signs including shortness of breath and cough and loss of taste and smell.  He was quarantined and has been released and is now back to work.  His sense of taste and smell is back to normal.  He had significant weight loss during this illness.  He is now trying to gain some weight back.  He continues to have easy fatigue and some shortness of breath with activity.  No ongoing cough fever sweats or chills.  Today his blood pressure is perfectly normal.  We will go ahead and refill medications.  Cholesterol stable.  GERD symptoms stable.  Thyroid stable.  History of Present Illness       Review of Systems   Constitutional: Positive for fatigue and unexpected weight change.   Respiratory: Positive for shortness of breath.       I have reviewed the ROS as documented above. Gilson Lancaster MD              OBJECTIVE   Objective   /67   Pulse 91   Temp 96.9 °F (36.1 °C) (Tympanic)   Resp 16   Ht 190.5 cm (75\")   Wt 91.6 kg (202 lb)   SpO2 97%   BMI 25.25 kg/m²  Body mass index is 25.25 kg/m².    Physical Exam  Vitals signs reviewed.   Constitutional:       General: He is not in acute distress.     Appearance: He is not diaphoretic.      Comments: No coughing in exam room   Cardiovascular:      Rate and Rhythm: Normal rate.      Heart sounds: Normal heart sounds.   Pulmonary:      Effort: Pulmonary effort is normal. No tachypnea, accessory muscle usage or respiratory distress.      Breath sounds: Normal breath sounds. No stridor. No decreased breath sounds, wheezing, rhonchi or rales.   Skin:     General: Skin is warm and dry.   Neurological:      Mental Status: He is " alert and oriented to person, place, and time.   Psychiatric:         Attention and Perception: He is attentive.         Speech: Speech normal.                         Procedures           ASSESSMENT/PLAN:     Assessment/Plan   Diagnoses and all orders for this visit:    1. Pure hypercholesterolemia (Primary)  Assessment & Plan:  Lipid abnormalities are unchanged.  Pharmacotherapy as ordered.  Lipids will be reassessed in 6 months.    Orders:  -     pravastatin (PRAVACHOL) 40 MG tablet; Take 1 tablet by mouth Every Night.  Dispense: 90 tablet; Refill: 1    2. Acquired hypothyroidism  Assessment & Plan:  The current medical regimen is effective;  continue present plan and medications.        Orders:  -     levothyroxine (SYNTHROID, LEVOTHROID) 100 MCG tablet; Take 1 tablet by mouth Daily for 180 days.  Dispense: 90 tablet; Refill: 1    3. Gastroesophageal reflux disease without esophagitis  Assessment & Plan:  The current medical regimen is effective;  continue present plan and medications.        Orders:  -     pantoprazole (PROTONIX) 40 MG EC tablet; Take 1 tablet by mouth Every Night for 180 days.  Dispense: 90 tablet; Refill: 1    4. COVID-19 virus detected  Assessment & Plan:  New problem.  Patient still with sequela of shortness of breath with minimal activity and nonproductive cough periodically.    Orders:  -     budesonide (PULMICORT) 180 MCG/ACT inhaler; Inhale 1 puff 2 (Two) Times a Day.  Dispense: 1 inhaler; Refill: 2        FOLLOW UP:    Return in about 3 months (around 1/29/2021) for Recheck/Medication.

## 2020-10-29 NOTE — ASSESSMENT & PLAN NOTE
New problem.  Patient still with sequela of shortness of breath with minimal activity and nonproductive cough periodically.

## 2021-03-30 ENCOUNTER — BULK ORDERING (OUTPATIENT)
Dept: CASE MANAGEMENT | Facility: OTHER | Age: 56
End: 2021-03-30

## 2021-03-30 DIAGNOSIS — Z23 IMMUNIZATION DUE: ICD-10-CM

## 2021-06-07 ENCOUNTER — PATIENT MESSAGE (OUTPATIENT)
Dept: FAMILY MEDICINE CLINIC | Facility: CLINIC | Age: 56
End: 2021-06-07

## 2021-06-07 DIAGNOSIS — E78.00 PURE HYPERCHOLESTEROLEMIA: ICD-10-CM

## 2021-06-07 DIAGNOSIS — E03.9 ACQUIRED HYPOTHYROIDISM: ICD-10-CM

## 2021-06-07 DIAGNOSIS — K21.9 GASTROESOPHAGEAL REFLUX DISEASE WITHOUT ESOPHAGITIS: ICD-10-CM

## 2021-06-07 DIAGNOSIS — U07.1 COVID-19 VIRUS DETECTED: ICD-10-CM

## 2021-06-08 RX ORDER — PANTOPRAZOLE SODIUM 40 MG/1
40 TABLET, DELAYED RELEASE ORAL NIGHTLY
Qty: 30 TABLET | Refills: 0 | Status: SHIPPED | OUTPATIENT
Start: 2021-06-08 | End: 2021-06-30 | Stop reason: SDUPTHER

## 2021-06-08 RX ORDER — LEVOTHYROXINE SODIUM 0.1 MG/1
100 TABLET ORAL DAILY
Qty: 30 TABLET | Refills: 0 | Status: SHIPPED | OUTPATIENT
Start: 2021-06-08 | End: 2021-06-30 | Stop reason: SDUPTHER

## 2021-06-08 RX ORDER — PRAVASTATIN SODIUM 40 MG
40 TABLET ORAL NIGHTLY
Qty: 30 TABLET | Refills: 0 | Status: SHIPPED | OUTPATIENT
Start: 2021-06-08 | End: 2021-06-30 | Stop reason: SDUPTHER

## 2021-06-08 NOTE — TELEPHONE ENCOUNTER
Yolis Iniguez LPN 6/8/2021 8:42 AM EDT      ----- Message -----  From: Rafita Gary  Sent: 6/7/2021 6:47 PM EDT  To: [facility] Clinical Pool  Subject: RE: Prescription Question     Sol[facility]ario at [address]   Would be fine. Thank you

## 2021-06-21 LAB
ALBUMIN SERPL-MCNC: 5 G/DL (ref 3.5–5.2)
ALBUMIN/GLOB SERPL: 2.3 G/DL
ALP SERPL-CCNC: 50 U/L (ref 39–117)
ALT SERPL-CCNC: 16 U/L (ref 1–41)
AST SERPL-CCNC: 18 U/L (ref 1–40)
BASOPHILS # BLD AUTO: 0.05 10*3/MM3 (ref 0–0.2)
BASOPHILS NFR BLD AUTO: 1 % (ref 0–1.5)
BILIRUB SERPL-MCNC: 1.1 MG/DL (ref 0–1.2)
BUN SERPL-MCNC: 19 MG/DL (ref 6–20)
BUN/CREAT SERPL: 13.3 (ref 7–25)
CALCIUM SERPL-MCNC: 9.7 MG/DL (ref 8.6–10.5)
CHLORIDE SERPL-SCNC: 102 MMOL/L (ref 98–107)
CHOLEST SERPL-MCNC: 172 MG/DL (ref 0–200)
CHOLEST/HDLC SERPL: 2.69 {RATIO}
CO2 SERPL-SCNC: 30.1 MMOL/L (ref 22–29)
CREAT SERPL-MCNC: 1.43 MG/DL (ref 0.76–1.27)
EOSINOPHIL # BLD AUTO: 0.27 10*3/MM3 (ref 0–0.4)
EOSINOPHIL NFR BLD AUTO: 5.3 % (ref 0.3–6.2)
ERYTHROCYTE [DISTWIDTH] IN BLOOD BY AUTOMATED COUNT: 12.7 % (ref 12.3–15.4)
GLOBULIN SER CALC-MCNC: 2.2 GM/DL
GLUCOSE SERPL-MCNC: 101 MG/DL (ref 65–99)
HCT VFR BLD AUTO: 50 % (ref 37.5–51)
HDLC SERPL-MCNC: 64 MG/DL (ref 40–60)
HGB BLD-MCNC: 17 G/DL (ref 13–17.7)
IMM GRANULOCYTES # BLD AUTO: 0.01 10*3/MM3 (ref 0–0.05)
IMM GRANULOCYTES NFR BLD AUTO: 0.2 % (ref 0–0.5)
LDLC SERPL CALC-MCNC: 89 MG/DL (ref 0–100)
LYMPHOCYTES # BLD AUTO: 2.1 10*3/MM3 (ref 0.7–3.1)
LYMPHOCYTES NFR BLD AUTO: 41.3 % (ref 19.6–45.3)
MCH RBC QN AUTO: 33.3 PG (ref 26.6–33)
MCHC RBC AUTO-ENTMCNC: 34 G/DL (ref 31.5–35.7)
MCV RBC AUTO: 98 FL (ref 79–97)
MONOCYTES # BLD AUTO: 0.45 10*3/MM3 (ref 0.1–0.9)
MONOCYTES NFR BLD AUTO: 8.8 % (ref 5–12)
NEUTROPHILS # BLD AUTO: 2.21 10*3/MM3 (ref 1.7–7)
NEUTROPHILS NFR BLD AUTO: 43.4 % (ref 42.7–76)
NRBC BLD AUTO-RTO: 0 /100 WBC (ref 0–0.2)
PLATELET # BLD AUTO: 213 10*3/MM3 (ref 140–450)
POTASSIUM SERPL-SCNC: 4.6 MMOL/L (ref 3.5–5.2)
PROT SERPL-MCNC: 7.2 G/DL (ref 6–8.5)
RBC # BLD AUTO: 5.1 10*6/MM3 (ref 4.14–5.8)
SODIUM SERPL-SCNC: 141 MMOL/L (ref 136–145)
TRIGL SERPL-MCNC: 105 MG/DL (ref 0–150)
TSH SERPL DL<=0.005 MIU/L-ACNC: 1.61 UIU/ML (ref 0.27–4.2)
VLDLC SERPL CALC-MCNC: 19 MG/DL (ref 5–40)
WBC # BLD AUTO: 5.09 10*3/MM3 (ref 3.4–10.8)

## 2021-06-30 ENCOUNTER — OFFICE VISIT (OUTPATIENT)
Dept: FAMILY MEDICINE CLINIC | Facility: CLINIC | Age: 56
End: 2021-06-30

## 2021-06-30 VITALS
HEART RATE: 83 BPM | TEMPERATURE: 98 F | SYSTOLIC BLOOD PRESSURE: 116 MMHG | RESPIRATION RATE: 16 BRPM | OXYGEN SATURATION: 97 % | DIASTOLIC BLOOD PRESSURE: 69 MMHG | WEIGHT: 214 LBS | HEIGHT: 75 IN | BODY MASS INDEX: 26.61 KG/M2

## 2021-06-30 DIAGNOSIS — Z11.59 NEED FOR HEPATITIS C SCREENING TEST: ICD-10-CM

## 2021-06-30 DIAGNOSIS — E78.00 PURE HYPERCHOLESTEROLEMIA: Primary | ICD-10-CM

## 2021-06-30 DIAGNOSIS — E03.9 ACQUIRED HYPOTHYROIDISM: ICD-10-CM

## 2021-06-30 DIAGNOSIS — N18.30 STAGE 3 CHRONIC KIDNEY DISEASE, UNSPECIFIED WHETHER STAGE 3A OR 3B CKD (HCC): ICD-10-CM

## 2021-06-30 DIAGNOSIS — R35.1 NOCTURIA: ICD-10-CM

## 2021-06-30 DIAGNOSIS — K21.9 GASTROESOPHAGEAL REFLUX DISEASE WITHOUT ESOPHAGITIS: ICD-10-CM

## 2021-06-30 PROBLEM — Z86.16 HISTORY OF COVID-19: Status: ACTIVE | Noted: 2020-09-29

## 2021-06-30 PROCEDURE — 99214 OFFICE O/P EST MOD 30 MIN: CPT | Performed by: FAMILY MEDICINE

## 2021-06-30 RX ORDER — PANTOPRAZOLE SODIUM 40 MG/1
40 TABLET, DELAYED RELEASE ORAL NIGHTLY
Qty: 90 TABLET | Refills: 1 | Status: SHIPPED | OUTPATIENT
Start: 2021-06-30 | End: 2021-12-22 | Stop reason: SDUPTHER

## 2021-06-30 RX ORDER — PRAVASTATIN SODIUM 40 MG
40 TABLET ORAL NIGHTLY
Qty: 90 TABLET | Refills: 1 | Status: SHIPPED | OUTPATIENT
Start: 2021-06-30 | End: 2021-12-22 | Stop reason: SDUPTHER

## 2021-06-30 RX ORDER — LEVOTHYROXINE SODIUM 0.1 MG/1
100 TABLET ORAL DAILY
Qty: 90 TABLET | Refills: 1 | Status: SHIPPED | OUTPATIENT
Start: 2021-06-30 | End: 2021-12-22 | Stop reason: SDUPTHER

## 2021-06-30 NOTE — ASSESSMENT & PLAN NOTE
GERD symptoms mostly controlled with current proton pump inhibitor.  Weight loss is encouraged.  Advise limitation on snacking.

## 2021-06-30 NOTE — PROGRESS NOTES
"Chief Complaint  Pure hypercholesterolemia (6 month follow up, med refill)    Shanae Ayala presents to [facility] to refill medications and review recent lab results. No medication side effects are reported. Overall the patient is feeling well.  Mild worsening cholesterol noted.  No change in therapy needed.  Patient to ask his dermatologist about umbilical skin tag that is bothering him.          Objective   Vital Signs:   Vitals:    06/30/21 1607   BP: 116/69   Pulse: 83   Resp: 16   Temp: 98 °F (36.7 °C)   TempSrc: Skin   SpO2: 97%   Weight: 97.1 kg (214 lb)   Height: 190.5 cm (75\")        Physical Exam  Vitals reviewed.   Constitutional:       General: He is not in acute distress.  Eyes:      General: Lids are normal.      Conjunctiva/sclera: Conjunctivae normal.   Neck:      Vascular: No carotid bruit.      Trachea: No tracheal deviation.   Cardiovascular:      Rate and Rhythm: Normal rate and regular rhythm.      Heart sounds: Normal heart sounds. No murmur heard.     Pulmonary:      Effort: Pulmonary effort is normal.      Breath sounds: Normal breath sounds.   Abdominal:      Comments: Small skin tag upper portion of umbilicus.  No drainage or redness noted.   Skin:     General: Skin is warm and dry.   Neurological:      Mental Status: He is alert. He is not disoriented.   Psychiatric:         Speech: Speech normal.         Behavior: Behavior normal. Behavior is cooperative.          Result Review :                Assessment and Plan    Diagnoses and all orders for this visit:    1. Pure hypercholesterolemia (Primary)  Assessment & Plan:  Lipid abnormalities are worsening.  Nutritional counseling was provided.  Lipids will be reassessed in 6 months.    Orders:  -     pravastatin (PRAVACHOL) 40 MG tablet; Take 1 tablet by mouth Every Night.  Dispense: 90 tablet; Refill: 1  -     Comprehensive Metabolic Panel; Future  -     CBC & Differential; Future  -     Lipid " Panel With / Chol / HDL Ratio; Future  -     CK; Future    2. Acquired hypothyroidism  Assessment & Plan:  The current medical regimen is effective;  continue present plan and medications.        Orders:  -     levothyroxine (SYNTHROID, LEVOTHROID) 100 MCG tablet; Take 1 tablet by mouth Daily for 180 days.  Dispense: 90 tablet; Refill: 1  -     TSH; Future    3. Gastroesophageal reflux disease without esophagitis  Assessment & Plan:  GERD symptoms mostly controlled with current proton pump inhibitor.  Weight loss is encouraged.  Advise limitation on snacking.    Orders:  -     pantoprazole (PROTONIX) 40 MG EC tablet; Take 1 tablet by mouth Every Night for 180 days.  Dispense: 90 tablet; Refill: 1    4. Nocturia  Assessment & Plan:  Nocturia symptoms are stable. PSA will be monitored with annual labs.        Orders:  -     PSA DIAGNOSTIC; Future    5. Stage 3 chronic kidney disease, unspecified whether stage 3a or 3b CKD (CMS/HCC)  Assessment & Plan:  Condition stable.  Patient will ask about Farxiga treatment at his next regular visit.      6. Need for hepatitis C screening test  -     Hepatitis C Antibody; Future      Follow Up   Return in about 6 months (around 12/30/2021) for Adult wellness and medication appt, 30 min.  Patient was given instructions and counseling regarding his condition or for health maintenance advice. Please see specific information pulled into the AVS if appropriate.

## 2021-12-22 ENCOUNTER — OFFICE VISIT (OUTPATIENT)
Dept: FAMILY MEDICINE CLINIC | Facility: CLINIC | Age: 56
End: 2021-12-22

## 2021-12-22 VITALS
OXYGEN SATURATION: 99 % | BODY MASS INDEX: 26.38 KG/M2 | TEMPERATURE: 97.5 F | HEART RATE: 76 BPM | WEIGHT: 212.2 LBS | SYSTOLIC BLOOD PRESSURE: 110 MMHG | HEIGHT: 75 IN | DIASTOLIC BLOOD PRESSURE: 60 MMHG | RESPIRATION RATE: 16 BRPM

## 2021-12-22 DIAGNOSIS — N18.31 STAGE 3A CHRONIC KIDNEY DISEASE (HCC): ICD-10-CM

## 2021-12-22 DIAGNOSIS — E78.00 PURE HYPERCHOLESTEROLEMIA: ICD-10-CM

## 2021-12-22 DIAGNOSIS — R35.1 NOCTURIA: ICD-10-CM

## 2021-12-22 DIAGNOSIS — K21.9 GASTROESOPHAGEAL REFLUX DISEASE WITHOUT ESOPHAGITIS: ICD-10-CM

## 2021-12-22 DIAGNOSIS — E03.9 ACQUIRED HYPOTHYROIDISM: Primary | ICD-10-CM

## 2021-12-22 PROBLEM — Z86.16 PERSONAL HISTORY OF COVID-19: Status: ACTIVE | Noted: 2020-09-29

## 2021-12-22 PROBLEM — M79.89 SWELLING OF TOE OF LEFT FOOT: Status: ACTIVE | Noted: 2019-09-03

## 2021-12-22 PROCEDURE — 99214 OFFICE O/P EST MOD 30 MIN: CPT | Performed by: FAMILY MEDICINE

## 2021-12-22 RX ORDER — PRAVASTATIN SODIUM 40 MG
40 TABLET ORAL NIGHTLY
Qty: 90 TABLET | Refills: 1 | Status: SHIPPED | OUTPATIENT
Start: 2021-12-22 | End: 2022-06-29 | Stop reason: SDUPTHER

## 2021-12-22 RX ORDER — LEVOTHYROXINE SODIUM 0.1 MG/1
100 TABLET ORAL DAILY
Qty: 90 TABLET | Refills: 1 | Status: SHIPPED | OUTPATIENT
Start: 2021-12-22 | End: 2022-06-29 | Stop reason: SDUPTHER

## 2021-12-22 RX ORDER — PANTOPRAZOLE SODIUM 40 MG/1
40 TABLET, DELAYED RELEASE ORAL NIGHTLY
Qty: 90 TABLET | Refills: 1 | Status: SHIPPED | OUTPATIENT
Start: 2021-12-22 | End: 2022-06-29 | Stop reason: SDUPTHER

## 2021-12-22 NOTE — PROGRESS NOTES
"Chief Complaint  Hyperlipidemia and Follow-up    Subjective          Jamie presents to [facility] to refill medications and review recent lab results. No medication side effects are reported. Overall the patient is feeling well.  Labs show mild progression of LDL cholesterol and mild elevation of TSH.  Once again bilirubin mildly elevated.          Objective   Vital Signs:   Vitals:    12/22/21 1145   BP: 110/60   BP Location: Left arm   Patient Position: Sitting   Cuff Size: Adult   Pulse: 76   Resp: 16   Temp: 97.5 °F (36.4 °C)   SpO2: 99%   Weight: 96.3 kg (212 lb 3.2 oz)   Height: 190.5 cm (75\")        Physical Exam  Vitals reviewed.   Constitutional:       General: He is not in acute distress.  Eyes:      General: Lids are normal.      Conjunctiva/sclera: Conjunctivae normal.   Neck:      Vascular: No carotid bruit.      Trachea: No tracheal deviation.   Cardiovascular:      Rate and Rhythm: Normal rate and regular rhythm.      Heart sounds: Normal heart sounds. No murmur heard.      Pulmonary:      Effort: Pulmonary effort is normal.      Breath sounds: Normal breath sounds.   Skin:     General: Skin is warm and dry.   Neurological:      Mental Status: He is alert. He is not disoriented.   Psychiatric:         Speech: Speech normal.         Behavior: Behavior normal. Behavior is cooperative.          Result Review :                Assessment and Plan    Diagnoses and all orders for this visit:    1. Acquired hypothyroidism (Primary)  Assessment & Plan:  Mild elevation of TSH.  Minimal symptoms of fatigue.  (Patient works third shift).  Will watch for trend before changing levothyroxine medication.  Recheck labs 6 months.    Orders:  -     levothyroxine (SYNTHROID, LEVOTHROID) 100 MCG tablet; Take 1 tablet by mouth Daily for 180 days.  Dispense: 90 tablet; Refill: 1  -     TSH; Future    2. Gastroesophageal reflux disease without esophagitis  Assessment & Plan:  Condition is " stable. The current medical regimen is effective;  continue present plan and medications.    Orders:  -     pantoprazole (PROTONIX) 40 MG EC tablet; Take 1 tablet by mouth Every Night for 180 days.  Dispense: 90 tablet; Refill: 1    3. Pure hypercholesterolemia  Assessment & Plan:  Mild elevation in LDL cholesterol.  Cholesterol content in food information shared and after visit summary.    Orders:  -     pravastatin (PRAVACHOL) 40 MG tablet; Take 1 tablet by mouth Every Night.  Dispense: 90 tablet; Refill: 1  -     Comprehensive Metabolic Panel; Future  -     CBC & Differential; Future  -     Lipid Panel With / Chol / HDL Ratio; Future  -     CK; Future    4. Nocturia  Assessment & Plan:  Nocturia symptoms are stable. PSA will be monitored with annual labs.       Orders:  -     PSA DIAGNOSTIC; Future    5. Stage 3a chronic kidney disease (HCC)  Assessment & Plan:  Renal condition is improving with treatment.  Continue current treatment regimen.  Renal condition will be reassessed in 6 months.        Follow Up   No follow-ups on file.  Patient was given instructions and counseling regarding his condition or for health maintenance advice. Please see specific information pulled into the AVS if appropriate.

## 2021-12-22 NOTE — ASSESSMENT & PLAN NOTE
Mild elevation of TSH.  Minimal symptoms of fatigue.  (Patient works third shift).  Will watch for trend before changing levothyroxine medication.  Recheck labs 6 months.

## 2021-12-22 NOTE — ASSESSMENT & PLAN NOTE
Mild elevation in LDL cholesterol.  Cholesterol content in food information shared and after visit summary.

## 2022-06-27 ENCOUNTER — OFFICE VISIT (OUTPATIENT)
Dept: FAMILY MEDICINE CLINIC | Facility: CLINIC | Age: 57
End: 2022-06-27

## 2022-06-27 VITALS
RESPIRATION RATE: 18 BRPM | SYSTOLIC BLOOD PRESSURE: 118 MMHG | HEART RATE: 72 BPM | HEIGHT: 75 IN | DIASTOLIC BLOOD PRESSURE: 74 MMHG | WEIGHT: 214 LBS | OXYGEN SATURATION: 98 % | TEMPERATURE: 97.5 F | BODY MASS INDEX: 26.61 KG/M2

## 2022-06-27 DIAGNOSIS — M79.602 PAIN OF LEFT UPPER EXTREMITY: Primary | ICD-10-CM

## 2022-06-27 PROBLEM — M79.89 SWELLING OF TOE OF LEFT FOOT: Status: RESOLVED | Noted: 2019-09-03 | Resolved: 2022-06-27

## 2022-06-27 PROBLEM — E78.00 PURE HYPERCHOLESTEROLEMIA: Status: RESOLVED | Noted: 2021-07-16 | Resolved: 2022-06-27

## 2022-06-27 PROBLEM — Z86.16 PERSONAL HISTORY OF COVID-19: Status: RESOLVED | Noted: 2020-09-29 | Resolved: 2022-06-27

## 2022-06-27 PROBLEM — E03.9 ACQUIRED HYPOTHYROIDISM: Status: RESOLVED | Noted: 2021-07-16 | Resolved: 2022-06-27

## 2022-06-27 PROBLEM — K21.9 GASTRO-ESOPHAGEAL REFLUX DISEASE WITHOUT ESOPHAGITIS: Status: RESOLVED | Noted: 2021-07-16 | Resolved: 2022-06-27

## 2022-06-27 PROCEDURE — 99213 OFFICE O/P EST LOW 20 MIN: CPT | Performed by: FAMILY MEDICINE

## 2022-06-27 RX ORDER — METHYLPREDNISOLONE 4 MG/1
TABLET ORAL
Qty: 21 TABLET | Refills: 0 | Status: SHIPPED | OUTPATIENT
Start: 2022-06-27 | End: 2022-07-03

## 2022-06-27 NOTE — PROGRESS NOTES
"Chief Complaint  Hypothyroidism (Med refills) and Arm Pain (Lt sharp arm pain x 2 wks, pain with current movements )    Shanae Ayala presents to [facility]  With 2-week history of left arm radicular type pain that is sometimes burning in nature.  Sometimes he does awaken after sleeping on it with discomfort.  The pain hits several spots along his arm including the upper bicep deltoid area and also his elbow all the way down to his wrist.  Certain motions exacerbate the pain.  Sometimes he has difficulty picking up even a gallon of milk to lift it up to the countertop.  No associated shortness of breath nausea or vomiting.  No associated pain with aerobic type activity like walking briskly.  No history of injury recently.  No reported  strength issues.  No numbness reported.        Objective   Vital Signs:   Vitals:    06/27/22 1455   BP: 118/74   Pulse: 72   Resp: 18   Temp: 97.5 °F (36.4 °C)   SpO2: 98%   Weight: 97.1 kg (214 lb)   Height: 190.5 cm (75\")                Physical Exam  Vitals reviewed.   Constitutional:       General: He is not in acute distress.  Musculoskeletal:      Comments: Left upper extremity good range of motion.  No tender spots noted except for left cubital fossa on ulnar side.  No swelling redness noted.  Negative Tinel's and Phalen's test.  Good  strength bilaterally.  Patient is right-handed.  Reflexes intact.  Vascular intact.          Result Review :                Assessment and Plan    Diagnoses and all orders for this visit:    1. Pain of left upper extremity (Primary)  Comments:  New problem.  Treat with Medrol Dosepak.  [facility] with progress in 1 week  Orders:  -     methylPREDNISolone (MEDROL) 4 MG dose pack; Take as directed on package instructions.  Dispense: 21 tablet; Refill: 0        Follow Up   No follow-ups on file.  Patient was given instructions and counseling regarding his condition or for " health maintenance advice. Please see specific information pulled into the AVS if appropriate.

## 2022-07-07 ENCOUNTER — TELEPHONE (OUTPATIENT)
Dept: FAMILY MEDICINE CLINIC | Facility: CLINIC | Age: 57
End: 2022-07-07

## 2022-07-07 DIAGNOSIS — M79.602 PAIN OF LEFT UPPER EXTREMITY: Primary | ICD-10-CM

## 2022-07-07 NOTE — TELEPHONE ENCOUNTER
----- Message from Gilson Lancaster MD sent at 7/7/2022  1:10 PM EDT -----  Regarding: FW: Follow up to last visit      ----- Message -----  From: Gerson Tidwell MA  Sent: 7/7/2022   8:36 AM EDT  To: Gilson Lancaster MD  Subject: FW: Follow up to last visit                        ----- Message -----  From: Rafita Gary  Sent: 7/6/2022   7:18 PM EDT  To: [facility] [place] [facility] Clinical Pool  Subject: Follow up to last visit                          Dr Lancaster,  During the time of taking the steroid for my arm, I felt immediate relief, which you said would probably be the case. And now the arm doesn’t hurt quite as much throughout the  full length but the pain is still persistent in the inner side of the elbow and extends up into the biceps and shoulder area.  Not sure if there is any exercise or stretching that would help, but am willing to give it a try if you think that would be best.    Thanks, Jamie

## 2022-08-01 ENCOUNTER — TREATMENT (OUTPATIENT)
Dept: PHYSICAL THERAPY | Facility: CLINIC | Age: 57
End: 2022-08-01

## 2022-08-01 DIAGNOSIS — M54.12 RADICULOPATHY, CERVICAL: Primary | ICD-10-CM

## 2022-08-01 DIAGNOSIS — M54.2 PAIN, NECK: ICD-10-CM

## 2022-08-01 DIAGNOSIS — M25.522 LEFT ELBOW PAIN: ICD-10-CM

## 2022-08-01 PROCEDURE — 97530 THERAPEUTIC ACTIVITIES: CPT | Performed by: PHYSICAL THERAPIST

## 2022-08-01 PROCEDURE — 97162 PT EVAL MOD COMPLEX 30 MIN: CPT | Performed by: PHYSICAL THERAPIST

## 2022-08-01 PROCEDURE — 97110 THERAPEUTIC EXERCISES: CPT | Performed by: PHYSICAL THERAPIST

## 2022-08-01 NOTE — PROGRESS NOTES
Physical Therapy Initial Evaluation and Plan of Care    Patient: Rafita Gary   : 1965  Diagnosis/ICD-10 Code:  Radiculopathy, cervical [M54.12]  Referring practitioner: Gilson Lancaster MD  Past Medical History Reviewed: 2022    PLOF: Independent and painfree    Subjective Evaluation    History of Present Illness  Date of onset: 2018  Mechanism of injury: My arm was hurting and my doctor gave me a steroid pack and that seemed to help some. Before that, I got pain that went down to my wrist. That started 2-3 month ago. It was hard to grab things. Now it just goes down to my elbow and feels almost like its in the elbow. My neck hurts all time. It seems pretty constant regardless of how I move it. My shoulder hurts when I wake up, both of them bother me. I had an injury in my R shoulder a while ago and did PT and it helped. I am able to work around my shoulder pain but sometimes it would catch and be very painful. I also have had issues going to lift my grandkids up.      Patient Occupation:  - works at [employer]  Current pain ratin  At best pain ratin  At worst pain ratin  Location: base of neck, upper L shoulder, and L lateral elbow  Quality: shooting.  Relieving factors: medications    Hand dominance: right    Patient Goals  Patient goals for therapy: decreased pain             Objective          Palpation   Left   Tenderness of the pectoralis major and pectoralis minor.     Right Tenderness of the pectoralis major, pectoralis minor and upper trapezius.     Active Range of Motion   Cervical/Thoracic Spine   Cervical    Left lateral flexion: 24 degrees   Right lateral flexion: 26 degrees   Left rotation: 60 (supine) degrees   Right rotation: 52 (supine) degrees     Thoracic   Flexion: 53 degrees   Extension: 55 degrees   Left Shoulder   Flexion: WFL  Abduction: WFL    Right Shoulder   Flexion: WFL  Abduction: WFL    Strength/Myotome Testing     Left Shoulder     Planes of Motion    Abduction: 5   External rotation at 0°: 5   Internal rotation at 0°: 5     Isolated Muscles   Lower trapezius: 3   Middle trapezius: 3     Right Shoulder     Planes of Motion   Abduction: 5 (pain)   External rotation at 0°: 5 (pain)   Internal rotation at 0°: 5     Isolated Muscles   Lower trapezius: 3   Middle trapezius: 3     Left Elbow   Flexion: 5 (pain)  Extension: 5    Right Elbow   Flexion: 5  Extension: 5    Tests   Cervical     Left   Positive active compression (Jarratt), cervical distraction, Spurling's sign, ULTT1 and ULTT3.     Right   Positive active compression (Jarratt), cervical distraction, Spurling's sign, ULTT1 and ULTT3.     Left Shoulder   Negative Hawkin's.     Right Shoulder   Positive Hawkin's and painful arc.     Additional Tests Details  No N/T w/ spurling's but PT noted discomfort in spine.    Thoracic Flexibility Comments:  Hypomobility from T5-T12 w/ PIVM assessment          Assessment & Plan     Assessment  Impairments: abnormal or restricted ROM, activity intolerance, impaired physical strength, lacks appropriate home exercise program and pain with function  Functional Limitations: carrying objects, lifting, reaching overhead and unable to perform repetitive tasks  Assessment details: Pt presents to PT with symptoms consistent with cervical radiculopathy and hypomobility. Therapy will focus on decreasing radicular symptoms and promoting stabilization of the neck.  Pt would benefit from skilled PT intervention to address the deficits noted.     Educated the pt to their injury, the pathology present and the plan of care for recovery.  Also reviewed good postural habits to decrease the stress of the cervical region.    Prognosis: good    Goals  Plan Goals: SHORT TERM GOALS: 4 Weeks  1. Pt will be compliant with HEP.  2. Pt to exhibit 35 degrees of B cervical lateral flexion to allow for viewing traffic without pain or limitations  3. Pt to report ability to sleep through the night  without awakening  4. Pt able to perform ADL's and recreational activities without pain     LONG TERM GOALS: 8 Weeks  1.  Pt to score <15 perceived disability on Quick DASH  2.  Pain level < 3/10 at worse with driving > 20 min. and ADL's  3.  Increased cervical AROM to WFL to allow for driving and household tasks with less restrictions.  4.  Pt able to job requirements and cleaning  activities without complaints of pain limiting function.     Plan  Therapy options: will be seen for skilled therapy services  Planned modality interventions: cryotherapy, electrical stimulation/Russian stimulation, iontophoresis, TENS, thermotherapy (hydrocollator packs), traction, ultrasound and dry needling  Planned therapy interventions: abdominal trunk stabilization, ADL retraining, body mechanics training, flexibility, functional ROM exercises, home exercise program, joint mobilization, manual therapy, neuromuscular re-education, postural training, soft tissue mobilization, spinal/joint mobilization, strengthening, stretching and therapeutic activities  Frequency: 2x week  Duration in weeks: 6  Treatment plan discussed with: patient        Manual Therapy:    -     mins  81642;  Therapeutic Exercise:    10     mins  86740;     Neuromuscular Cachorro:    -    mins  96821;    Therapeutic Activity:     15     mins  80244;     Gait Training:      -     mins  21574;     Ultrasound:     -     mins  82610;    Electrical Stimulation:    -     mins  39357 ( );  Dry Needling     -     mins self-pay    Timed Treatment:   25   mins   Total Treatment:     60   mins    Hailey Wachter  Student Physical Therapist     I was present in the PT department guiding the student by approving, concurring and confirming the skilled judgement for all services rendered    PT SIGNATURE: Yudith Morris PT   [place] license #: [id number]  DATE TREATMENT INITIATED: 8/1/2022    Initial Certification  Certification Period: 10/30/2022  I certify that the therapy services  are furnished while this patient is under my care.  The services outlined above are required by this patient, and will be reviewed every 90 days.     PHYSICIAN: Gilson Lancaster MD      DATE:     Please sign and return via fax to [phone].. Thank you, [facility].

## 2022-08-11 ENCOUNTER — TREATMENT (OUTPATIENT)
Dept: PHYSICAL THERAPY | Facility: CLINIC | Age: 57
End: 2022-08-11

## 2022-08-11 DIAGNOSIS — M25.522 LEFT ELBOW PAIN: ICD-10-CM

## 2022-08-11 DIAGNOSIS — M54.12 RADICULOPATHY, CERVICAL: Primary | ICD-10-CM

## 2022-08-11 DIAGNOSIS — M54.2 PAIN, NECK: ICD-10-CM

## 2022-08-11 PROCEDURE — 97110 THERAPEUTIC EXERCISES: CPT | Performed by: PHYSICAL THERAPIST

## 2022-08-11 NOTE — PROGRESS NOTES
Physical Therapy Daily Treatment Note  Visit # 2        Patient: aRfita Gary   : 1965  Referring practitioner: Gilson Lancaster MD  Date of Initial Evaluation:  Type: THERAPY  Noted: 2022  Today's Date: 2022           ICD-10-CM ICD-9-CM   1. Radiculopathy, cervical  M54.12 723.4   2. Pain, neck  M54.2 723.1   3. Left elbow pain  M25.522 719.42       Subjective  Rafita Gary reports:   I felt a little worse after starting the exercises.  My elbow is aching right now.     Objective   See Exercise, Manual, and Modality Logs for complete treatment.   Reviewed current HEP, progressed therex program with exercises as noted.   Trial myofascial decompression to (L) side CS x5 cups.    Added CS retraction in supine and sitting to HEP, written instructions issued ([facility] code [id number]).    Assessment/Plan  Tolerated continued progression of therapeutic exercise/therapeutic activity well today, no increased pain reported during or after exercises.  Would continue to benefit from skilled PT progressing with functional WB and strength.      Progress per Plan of Care         Timed:         Manual Therapy:      5   mins  67004     Therapeutic Exercise:     40    mins  94262     Neuromuscular Cachorro:        mins  87558    Therapeutic Activity:          mins  33463     Gait Training:           mins  31124     Ultrasound:          mins  08497    Ionto                                   mins  18053  Self Care                            mins  47724    Un-Timed:  Electrical Stimulation:         mins 73684 ( )  Traction          mins 97155    Timed Treatment:   45   mins   Total Treatment:     52   mins    LINDSAY Mckeon License #[id number]  Physical Therapist Assistant

## 2022-08-15 ENCOUNTER — TREATMENT (OUTPATIENT)
Dept: PHYSICAL THERAPY | Facility: CLINIC | Age: 57
End: 2022-08-15

## 2022-08-15 DIAGNOSIS — M54.12 RADICULOPATHY, CERVICAL: Primary | ICD-10-CM

## 2022-08-15 DIAGNOSIS — M25.522 LEFT ELBOW PAIN: ICD-10-CM

## 2022-08-15 DIAGNOSIS — M54.2 PAIN, NECK: ICD-10-CM

## 2022-08-15 PROCEDURE — 97110 THERAPEUTIC EXERCISES: CPT | Performed by: PHYSICAL THERAPIST

## 2022-08-15 NOTE — PROGRESS NOTES
Physical Therapy Daily Treatment Note  Visit # 3        Patient: Rafita Gary   : 1965  Referring practitioner: Gilson Lancaster MD  Date of Initial Evaluation:  Type: THERAPY  Noted: 2022  Today's Date: 8/15/2022           ICD-10-CM ICD-9-CM   1. Radiculopathy, cervical  M54.12 723.4   2. Pain, neck  M54.2 723.1   3. Left elbow pain  M25.522 719.42       Subjective  Rafita Gary reports:   The cupping seemed to help.  I think the exercises have gotten better over the past few days.     Objective   See Exercise, Manual, and Modality Logs for complete treatment.   Reviewed current HEP, progressed therex program with exercises as noted.   Continued myofascial decompression to (L) side CS and down UE to elbow, x7 cups.    Added thoracic extension mobs seated w/towel to HEP, written instructions texted to pt via [employer].    Assessment/Plan  Tolerated continued progression of therapeutic exercise/therapeutic activity well today, no increased pain reported during or after exercises.  Did find he needed to modify nerve glide exercises slightly for inc comfort/tolreance.   Would continue to benefit from skilled PT progressing with functional WB and strength.      Progress per Plan of Care         Timed:         Manual Therapy:     6   mins  40190     Therapeutic Exercise:     40    mins  33876     Neuromuscular Cachorro:        mins  71823    Therapeutic Activity:          mins  49012     Gait Training:           mins  19489     Ultrasound:          mins  29274    Ionto                                   mins  01546  Self Care                            mins  08101    Un-Timed:  Electrical Stimulation:         mins 10283 ( )  Traction          mins 81891    Timed Treatment:   46   mins   Total Treatment:     52   mins    Yuan Garrett PTA  [place] License #[id number]  Physical Therapist Assistant

## 2022-08-18 ENCOUNTER — TREATMENT (OUTPATIENT)
Dept: PHYSICAL THERAPY | Facility: CLINIC | Age: 57
End: 2022-08-18

## 2022-08-18 DIAGNOSIS — M54.12 RADICULOPATHY, CERVICAL: Primary | ICD-10-CM

## 2022-08-18 DIAGNOSIS — M25.522 LEFT ELBOW PAIN: ICD-10-CM

## 2022-08-18 DIAGNOSIS — M54.2 PAIN, NECK: ICD-10-CM

## 2022-08-18 PROCEDURE — 97110 THERAPEUTIC EXERCISES: CPT | Performed by: PHYSICAL THERAPIST

## 2022-08-18 PROCEDURE — 97140 MANUAL THERAPY 1/> REGIONS: CPT | Performed by: PHYSICAL THERAPIST

## 2022-08-18 NOTE — PROGRESS NOTES
Physical Therapy Daily Treatment Note      Patient: Rafita Gary   : 1965  Referring practitioner: Gilson Lancaster MD  Date of Initial Visit: Type: THERAPY  Noted: 2022  Today's Date: 2022  Patient seen for 4 sessions       Visit Diagnoses:    ICD-10-CM ICD-9-CM   1. Radiculopathy, cervical  M54.12 723.4   2. Pain, neck  M54.2 723.1   3. Left elbow pain  M25.522 719.42       Rafita Gary reports: The cupping really seems to help. I wish we could do it on my right side    Subjective     Objective   See Exercise, Manual, and Modality Logs for complete treatment.       Assessment & Plan     Assessment    Assessment details: Pt doing well. Discussed using ice after overusing his neck and to really watch his heavy lifting as we rehab his neck    Plan  Plan details: Progress with side arcs        Manual Therapy:    8     mins  78303;  Therapeutic Exercise:    25     mins  72878;     Neuromuscular Cachorro:    -    mins  67772;    Therapeutic Activity:     -     mins  70647;     Gait Training:      -     mins  79302;     Ultrasound:     -     mins  62539;    Electrical Stimulation:    -     mins  94036 ( );  Dry Needling     -     mins self-pay    Timed Treatment:   33   mins   Total Treatment:     36   mins    Yudith Morris PT  [place] License #: [id number]    Physical Therapist

## 2022-08-22 ENCOUNTER — TREATMENT (OUTPATIENT)
Dept: PHYSICAL THERAPY | Facility: CLINIC | Age: 57
End: 2022-08-22

## 2022-08-22 DIAGNOSIS — M54.12 RADICULOPATHY, CERVICAL: Primary | ICD-10-CM

## 2022-08-22 DIAGNOSIS — M25.522 LEFT ELBOW PAIN: ICD-10-CM

## 2022-08-22 DIAGNOSIS — M54.2 PAIN, NECK: ICD-10-CM

## 2022-08-22 PROCEDURE — 97140 MANUAL THERAPY 1/> REGIONS: CPT | Performed by: PHYSICAL THERAPIST

## 2022-08-22 PROCEDURE — 97110 THERAPEUTIC EXERCISES: CPT | Performed by: PHYSICAL THERAPIST

## 2022-08-22 NOTE — PROGRESS NOTES
Physical Therapy Daily Treatment Note      Patient: Rafita Gary   : 1965  Referring practitioner: Gilson Lancaster MD  Date of Initial Visit: Type: THERAPY  Noted: 2022  Today's Date: 2022  Patient seen for 5 sessions       Visit Diagnoses:    ICD-10-CM ICD-9-CM   1. Radiculopathy, cervical  M54.12 723.4   2. Pain, neck  M54.2 723.1   3. Left elbow pain  M25.522 719.42       Subjective   Rafita Gary reports:  I was leaving work today and I hit my head (wearing hardhat) on a low hanging pipe.  It anastasiia my neck, everything feels very tight right now.     Objective   See Exercise, Manual, and Modality Logs for complete treatment.     Assessment & Plan     Assessment    Assessment details: Increased focus on manual therapy today, supine and with myofascial decompression in sitting (B).  Pt reporting inc ROM and dec mm tension at conclusion of session.     Plan  Plan details: Progress with side arcs        Manual Therapy:    25     mins  64739;  Therapeutic Exercise:    10    mins  77032;     Neuromuscular Cachorro:    -    mins  77447;    Therapeutic Activity:     -     mins  94665;     Gait Training:      -     mins  12019;     Ultrasound:     -     mins  61403;    Electrical Stimulation:    -     mins  07403 ( );  Dry Needling     -     mins self-pay    Timed Treatment:   35   mins   Total Treatment:     45   mins    LINDSAY Mckeon License #[id number]  Physical Therapist Assistant

## 2022-08-24 ENCOUNTER — TREATMENT (OUTPATIENT)
Dept: PHYSICAL THERAPY | Facility: CLINIC | Age: 57
End: 2022-08-24

## 2022-08-24 DIAGNOSIS — M54.2 PAIN, NECK: ICD-10-CM

## 2022-08-24 DIAGNOSIS — M54.12 RADICULOPATHY, CERVICAL: Primary | ICD-10-CM

## 2022-08-24 PROCEDURE — 97140 MANUAL THERAPY 1/> REGIONS: CPT | Performed by: PHYSICAL THERAPIST

## 2022-08-24 PROCEDURE — 97110 THERAPEUTIC EXERCISES: CPT | Performed by: PHYSICAL THERAPIST

## 2022-08-24 NOTE — PROGRESS NOTES
Physical Therapy Daily Treatment Note      Patient: Rafita Gary   : 1965  Referring practitioner: Gilson Lancaster MD  Date of Initial Visit: Type: THERAPY  Noted: 2022  Today's Date: 2022  Patient seen for 6 sessions       Visit Diagnoses:    ICD-10-CM ICD-9-CM   1. Radiculopathy, cervical  M54.12 723.4   2. Pain, neck  M54.2 723.1       Subjective   Rafita Gary reports:  I'm feeling much better today than I did last time.  It's still feeling tight on the (R) side.       Objective   See Exercise, Manual, and Modality Logs for complete treatment.   Reviewed current HEP, progressed therex program with exercises as noted.  Added side arcs (B) to HEP.    Assessment & Plan     Assessment    Assessment details: Tolerated continued manual therapy and progression of therapeutic exercise well today, no increased pain reported during or after exercises.       Plan  Plan details: Cont per PT POC.         Manual Therapy:    10     mins  59111;  Therapeutic Exercise:    30    mins  42847;     Neuromuscular Cachorro:    -    mins  55394;    Therapeutic Activity:     -     mins  76262;     Gait Training:      -     mins  47062;     Ultrasound:     -     mins  98556;    Electrical Stimulation:    -     mins  81252 ( );  Dry Needling     -     mins self-pay    Timed Treatment:   40   mins   Total Treatment:     45   mins    LINDSAY Mckeon License #[id number]  Physical Therapist Assistant

## 2022-08-29 ENCOUNTER — TREATMENT (OUTPATIENT)
Dept: PHYSICAL THERAPY | Facility: CLINIC | Age: 57
End: 2022-08-29

## 2022-08-29 DIAGNOSIS — M25.522 LEFT ELBOW PAIN: ICD-10-CM

## 2022-08-29 DIAGNOSIS — M54.2 PAIN, NECK: ICD-10-CM

## 2022-08-29 DIAGNOSIS — M54.12 RADICULOPATHY, CERVICAL: Primary | ICD-10-CM

## 2022-08-29 PROCEDURE — 97110 THERAPEUTIC EXERCISES: CPT | Performed by: PHYSICAL THERAPIST

## 2022-08-29 PROCEDURE — 97140 MANUAL THERAPY 1/> REGIONS: CPT | Performed by: PHYSICAL THERAPIST

## 2022-08-29 NOTE — PROGRESS NOTES
Physical Therapy Daily Treatment Note      Patient: Rafita Gary   : 1965  Referring practitioner: Gilson Lancaster MD  Date of Initial Visit: Type: THERAPY  Noted: 2022  Today's Date: 2022  Patient seen for 7 sessions       Visit Diagnoses:    ICD-10-CM ICD-9-CM   1. Radiculopathy, cervical  M54.12 723.4   2. Pain, neck  M54.2 723.1   3. Left elbow pain  M25.522 719.42       Subjective   Rafita Gary reports:  My neck is doing a lot better, I can rotate to both sides without pain now.  I am still having (L) arm pain when I try to reach out and lift a gallon of milk.      Objective   See Exercise, Manual, and Modality Logs for complete treatment.   Added cupping and IASTM treatment to (L) lat epicondyle/wrist extensor tendon area.     Assessment & Plan     Assessment    Assessment details: Tolerated continued manual therapy and progression of therapeutic exercise well today, responding well to CS/UT soft tissue treatment.  Progressed with soft tissue work into (L) forearm musculature to address persistent weakness/pain in that area.      Plan  Plan details: Cont per PT POC.         Manual Therapy:    30     mins  61208;  Therapeutic Exercise:    10   mins  20588;     Neuromuscular Cachorro:    -    mins  59680;    Therapeutic Activity:     -     mins  77598;     Gait Training:      -     mins  02783;     Ultrasound:     -     mins  85920;    Electrical Stimulation:    -     mins  86491 ( );  Dry Needling     -     mins self-pay    Timed Treatment:   40   mins   Total Treatment:     48   mins    LINDSAY Mckeon License #[id number]  Physical Therapist Assistant

## 2022-09-06 ENCOUNTER — TREATMENT (OUTPATIENT)
Dept: PHYSICAL THERAPY | Facility: CLINIC | Age: 57
End: 2022-09-06

## 2022-09-06 DIAGNOSIS — M54.12 RADICULOPATHY, CERVICAL: Primary | ICD-10-CM

## 2022-09-06 DIAGNOSIS — M54.2 PAIN, NECK: ICD-10-CM

## 2022-09-06 DIAGNOSIS — M25.522 LEFT ELBOW PAIN: ICD-10-CM

## 2022-09-06 PROCEDURE — 97140 MANUAL THERAPY 1/> REGIONS: CPT | Performed by: PHYSICAL THERAPIST

## 2022-09-06 PROCEDURE — 97110 THERAPEUTIC EXERCISES: CPT | Performed by: PHYSICAL THERAPIST

## 2022-09-06 NOTE — PROGRESS NOTES
Physical Therapy Daily Treatment Note      Patient: Rafita Gary   : 1965  Referring practitioner: Gilson Lancaster MD  Date of Initial Visit: Type: THERAPY  Noted: 2022  Today's Date: 2022  Patient seen for 8 sessions       Visit Diagnoses:    ICD-10-CM ICD-9-CM   1. Radiculopathy, cervical  M54.12 723.4   2. Pain, neck  M54.2 723.1   3. Left elbow pain  M25.522 719.42       Rafita Gary reports: I am doing alright. The neck is doing better, the elbow is off and on. I have been doing the exercises    Subjective     Objective   See Exercise, Manual, and Modality Logs for complete treatment.       Assessment & Plan     Assessment    Assessment details: Pt doing better. Added horz ABD and diagonals to HEP    Plan  Plan details: Continue per POC        Manual Therapy:    10     mins  45184;  Therapeutic Exercise:    30     mins  56277;     Neuromuscular Cachorro:    -    mins  20634;    Therapeutic Activity:     -     mins  19247;     Gait Training:      -     mins  81133;     Ultrasound:     -     mins  16164;    Electrical Stimulation:    -     mins  73942 ( );  Dry Needling     -     mins self-pay    Timed Treatment:   40   mins   Total Treatment:     42   mins    Yudith Morris PT  [place] License #: [id number]    Physical Therapist

## 2022-09-09 ENCOUNTER — TREATMENT (OUTPATIENT)
Dept: PHYSICAL THERAPY | Facility: CLINIC | Age: 57
End: 2022-09-09

## 2022-09-09 DIAGNOSIS — M54.2 PAIN, NECK: ICD-10-CM

## 2022-09-09 DIAGNOSIS — M54.12 RADICULOPATHY, CERVICAL: Primary | ICD-10-CM

## 2022-09-09 PROCEDURE — 97110 THERAPEUTIC EXERCISES: CPT | Performed by: PHYSICAL THERAPIST

## 2022-09-09 PROCEDURE — 97140 MANUAL THERAPY 1/> REGIONS: CPT | Performed by: PHYSICAL THERAPIST

## 2022-09-09 NOTE — PROGRESS NOTES
Physical Therapy Daily Treatment Note      Patient: Rafita Gary   : 1965  Referring practitioner: Gilson Lancaster MD  Date of Initial Visit: Type: THERAPY  Noted: 2022  Today's Date: 2022  Patient seen for 9 sessions       Visit Diagnoses:    ICD-10-CM ICD-9-CM   1. Radiculopathy, cervical  M54.12 723.4   2. Pain, neck  M54.2 723.1       Rafita Gary reports: The neck is doing good and I think the elbow is better when I wear the strap. Pain is about a 4/10 in the neck at worst. No radicular pain    Subjective     Objective          Active Range of Motion   Cervical/Thoracic Spine   Cervical    Left lateral flexion: 25 degrees   Right lateral flexion: 30 degrees   Left rotation: 64 degrees   Right rotation: 70 degrees       See Exercise, Manual, and Modality Logs for complete treatment.       Assessment & Plan     Assessment    Assessment details: Pt has made good progress in PT for his neck and his elbow. At this time he can continue HEP for neck and we will focus on a couple more sessions for rehab of his lateral epicondylitis on the left     Plan  Plan details: DC at this time for his neck  Continue 1x/wk for 2-3 weeks for his elbow  Add Body Blade next session        Manual Therapy:    15     mins  20681;  Therapeutic Exercise:    30     mins  36971;     Neuromuscular Cachorro:    -    mins  20964;    Therapeutic Activity:     -     mins  19580;     Gait Training:      -     mins  13196;     Ultrasound:     -     mins  91896;    Electrical Stimulation:    -     mins  35897 ( );  Dry Needling     -     mins self-pay    Timed Treatment:   45   mins   Total Treatment:     45   mins    LAURA Ruffin License #: [id number]    Physical Therapist

## 2023-01-04 ENCOUNTER — TELEPHONE (OUTPATIENT)
Dept: FAMILY MEDICINE CLINIC | Facility: CLINIC | Age: 58
End: 2023-01-04
Payer: COMMERCIAL

## 2023-01-04 DIAGNOSIS — E03.9 ACQUIRED HYPOTHYROIDISM: ICD-10-CM

## 2023-01-04 DIAGNOSIS — E78.00 PURE HYPERCHOLESTEROLEMIA: Primary | ICD-10-CM

## 2023-01-04 DIAGNOSIS — N18.31 STAGE 3A CHRONIC KIDNEY DISEASE: ICD-10-CM

## 2023-01-05 LAB
ALBUMIN SERPL-MCNC: 4.5 G/DL (ref 3.5–5.2)
ALBUMIN/GLOB SERPL: 2 G/DL
ALP SERPL-CCNC: 51 U/L (ref 39–117)
ALT SERPL-CCNC: 17 U/L (ref 1–41)
AST SERPL-CCNC: 22 U/L (ref 1–40)
BASOPHILS # BLD AUTO: 0.06 10*3/MM3 (ref 0–0.2)
BASOPHILS NFR BLD AUTO: 1.1 % (ref 0–1.5)
BILIRUB SERPL-MCNC: 1.2 MG/DL (ref 0–1.2)
BUN SERPL-MCNC: 17 MG/DL (ref 6–20)
BUN/CREAT SERPL: 13.4 (ref 7–25)
CALCIUM SERPL-MCNC: 9.1 MG/DL (ref 8.6–10.5)
CHLORIDE SERPL-SCNC: 103 MMOL/L (ref 98–107)
CHOLEST SERPL-MCNC: 171 MG/DL (ref 0–200)
CK SERPL-CCNC: 152 U/L (ref 20–200)
CO2 SERPL-SCNC: 27.1 MMOL/L (ref 22–29)
CREAT SERPL-MCNC: 1.27 MG/DL (ref 0.76–1.27)
EGFRCR SERPLBLD CKD-EPI 2021: 65.1 ML/MIN/1.73
EOSINOPHIL # BLD AUTO: 0.32 10*3/MM3 (ref 0–0.4)
EOSINOPHIL NFR BLD AUTO: 5.9 % (ref 0.3–6.2)
ERYTHROCYTE [DISTWIDTH] IN BLOOD BY AUTOMATED COUNT: 12.5 % (ref 12.3–15.4)
GLOBULIN SER CALC-MCNC: 2.3 GM/DL
GLUCOSE SERPL-MCNC: 92 MG/DL (ref 65–99)
HCT VFR BLD AUTO: 47.7 % (ref 37.5–51)
HDLC SERPL-MCNC: 55 MG/DL (ref 40–60)
HGB BLD-MCNC: 16.4 G/DL (ref 13–17.7)
IMM GRANULOCYTES # BLD AUTO: 0.01 10*3/MM3 (ref 0–0.05)
IMM GRANULOCYTES NFR BLD AUTO: 0.2 % (ref 0–0.5)
LDLC SERPL CALC-MCNC: 101 MG/DL (ref 0–100)
LYMPHOCYTES # BLD AUTO: 1.85 10*3/MM3 (ref 0.7–3.1)
LYMPHOCYTES NFR BLD AUTO: 34.1 % (ref 19.6–45.3)
MCH RBC QN AUTO: 33 PG (ref 26.6–33)
MCHC RBC AUTO-ENTMCNC: 34.4 G/DL (ref 31.5–35.7)
MCV RBC AUTO: 96 FL (ref 79–97)
MONOCYTES # BLD AUTO: 0.53 10*3/MM3 (ref 0.1–0.9)
MONOCYTES NFR BLD AUTO: 9.8 % (ref 5–12)
NEUTROPHILS # BLD AUTO: 2.65 10*3/MM3 (ref 1.7–7)
NEUTROPHILS NFR BLD AUTO: 48.9 % (ref 42.7–76)
NRBC BLD AUTO-RTO: 0 /100 WBC (ref 0–0.2)
PLATELET # BLD AUTO: 226 10*3/MM3 (ref 140–450)
POTASSIUM SERPL-SCNC: 4.8 MMOL/L (ref 3.5–5.2)
PROT SERPL-MCNC: 6.8 G/DL (ref 6–8.5)
RBC # BLD AUTO: 4.97 10*6/MM3 (ref 4.14–5.8)
SODIUM SERPL-SCNC: 141 MMOL/L (ref 136–145)
T3FREE SERPL-MCNC: 3.2 PG/ML (ref 2–4.4)
T4 FREE SERPL-MCNC: 1.49 NG/DL (ref 0.93–1.7)
TRIGL SERPL-MCNC: 81 MG/DL (ref 0–150)
TSH SERPL DL<=0.005 MIU/L-ACNC: 2.36 UIU/ML (ref 0.27–4.2)
VLDLC SERPL CALC-MCNC: 15 MG/DL (ref 5–40)
WBC # BLD AUTO: 5.42 10*3/MM3 (ref 3.4–10.8)

## 2023-01-09 ENCOUNTER — OFFICE VISIT (OUTPATIENT)
Dept: FAMILY MEDICINE CLINIC | Facility: CLINIC | Age: 58
End: 2023-01-09
Payer: COMMERCIAL

## 2023-01-09 VITALS
RESPIRATION RATE: 16 BRPM | HEART RATE: 75 BPM | DIASTOLIC BLOOD PRESSURE: 67 MMHG | WEIGHT: 216 LBS | OXYGEN SATURATION: 99 % | SYSTOLIC BLOOD PRESSURE: 125 MMHG | HEIGHT: 75 IN | TEMPERATURE: 97.5 F | BODY MASS INDEX: 26.86 KG/M2

## 2023-01-09 DIAGNOSIS — E78.00 PURE HYPERCHOLESTEROLEMIA: Primary | ICD-10-CM

## 2023-01-09 DIAGNOSIS — N18.31 STAGE 3A CHRONIC KIDNEY DISEASE: ICD-10-CM

## 2023-01-09 DIAGNOSIS — M75.51 ACUTE BURSITIS OF RIGHT SHOULDER: ICD-10-CM

## 2023-01-09 DIAGNOSIS — K21.9 GASTROESOPHAGEAL REFLUX DISEASE WITHOUT ESOPHAGITIS: ICD-10-CM

## 2023-01-09 DIAGNOSIS — E03.9 ACQUIRED HYPOTHYROIDISM: ICD-10-CM

## 2023-01-09 PROCEDURE — 99214 OFFICE O/P EST MOD 30 MIN: CPT | Performed by: FAMILY MEDICINE

## 2023-01-09 PROCEDURE — 73030 X-RAY EXAM OF SHOULDER: CPT | Performed by: FAMILY MEDICINE

## 2023-01-09 RX ORDER — LEVOTHYROXINE SODIUM 0.1 MG/1
100 TABLET ORAL DAILY
Qty: 90 TABLET | Refills: 2 | Status: SHIPPED | OUTPATIENT
Start: 2023-01-09 | End: 2023-03-31 | Stop reason: SDUPTHER

## 2023-01-09 RX ORDER — PANTOPRAZOLE SODIUM 40 MG/1
40 TABLET, DELAYED RELEASE ORAL NIGHTLY
Qty: 90 TABLET | Refills: 2 | Status: SHIPPED | OUTPATIENT
Start: 2023-01-09 | End: 2023-03-31 | Stop reason: SDUPTHER

## 2023-01-09 RX ORDER — ROSUVASTATIN CALCIUM 10 MG/1
10 TABLET, COATED ORAL NIGHTLY
Qty: 90 TABLET | Refills: 2 | Status: SHIPPED | OUTPATIENT
Start: 2023-01-09 | End: 2023-03-31 | Stop reason: SDUPTHER

## 2023-01-09 NOTE — PROGRESS NOTES
Chief Complaint  Med Refill and Shoulder Pain    Shanae Ayala presents to [facility]      to refill medications and review labs.  No medication side effects are reported.    Patient has had some right shoulder pain over the past 3 weeks.  No injury reported.  He does use his arms quite a bit at work.  Pain worse with raising arm above head level.  History of previous injuries to the right shoulder in the past.  Answers for HPI/ROS submitted by the patient on 1/7/2023  What is the primary reason for your visit?: Other  Please describe your symptoms.: Right shoulder pain for several weeks, have been icing and heating but not really helping. Tried some stretching with resistance bands but not too successful., Also need to get new scripts for prescriptions.  Have you had these symptoms before?: Yes  How long have you been having these symptoms?: Greater than 2 weeks  Please list any medications you are currently taking for this condition.: Tylenol  Please describe any probable cause for these symptoms. : Over use throughout the years, dislocation when i was young.              Objective   Vital Signs:   Vitals:    01/09/23 1053   BP: 125/67   BP Location: Left arm   Patient Position: Sitting   Cuff Size: Adult   Pulse: 75   Resp: 16   Temp: 97.5 °F (36.4 °C)   TempSrc: Oral   SpO2: 99%   Weight: 98 kg (216 lb)   Height: 190.5 cm (75\")                Physical Exam  Vitals reviewed.   Constitutional:       General: He is not in acute distress.  Eyes:      General: Lids are normal.      Conjunctiva/sclera: Conjunctivae normal.   Neck:      Vascular: No carotid bruit.      Trachea: No tracheal deviation.   Cardiovascular:      Rate and Rhythm: Normal rate and regular rhythm.      Heart sounds: Normal heart sounds. No murmur heard.  Pulmonary:      Effort: Pulmonary effort is normal.      Breath sounds: Normal breath sounds.   Musculoskeletal:      Right shoulder: Tenderness  present. Decreased range of motion:  Slow abduction right shoulder due to discomfort.   Skin:     General: Skin is warm and dry.   Neurological:      Mental Status: He is alert. He is not disoriented.   Psychiatric:         Speech: Speech normal.         Behavior: Behavior normal. Behavior is cooperative.          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  No further findings         Assessment and Plan    Diagnoses and all orders for this visit:    1. Pure hypercholesterolemia (Primary)  Assessment & Plan:  LDL cholesterol above goal.  Change from pravastatin to rosuvastatin 10 mg bedtime dosing.  Recheck labs 6 months.    Orders:  -     rosuvastatin (CRESTOR) 10 MG tablet; Take 1 tablet by mouth Every Night for 270 days.  Dispense: 90 tablet; Refill: 2  -     Comprehensive Metabolic Panel; Future  -     CBC & Differential; Future  -     Lipid Panel With / Chol / HDL Ratio; Future  -     CK; Future    2. Acute bursitis of right shoulder  Assessment & Plan:  New problem.  Referral to orthopedist.    Orders:  -     XR Shoulder 2+ View Right  -     Ambulatory Referral to Orthopedic Surgery    3. Acquired hypothyroidism  Assessment & Plan:  Condition is stable. The current medical regimen is effective;  continue present plan and medications.    Orders:  -     levothyroxine (SYNTHROID, LEVOTHROID) 100 MCG tablet; Take 1 tablet by mouth Daily for 270 days.  Dispense: 90 tablet; Refill: 2  -     TSH+Free T4; Future    4. Gastroesophageal reflux disease without esophagitis  Assessment & Plan:  GERD symptoms stable.  Continue to take proton pump inhibitor as prescribed.    Orders:  -     pantoprazole (PROTONIX) 40 MG EC tablet; Take 1 tablet by mouth Every Night for 270 days.  Dispense: 90 tablet; Refill: 2    5. Stage 3a chronic kidney disease (HCC)  Assessment & Plan:  Chronic kidney disease is showing interval  improvement.  Normalization of creatinine noted on labs today.        Follow Up   Return in about 6 months (around 7/9/2023) for recheck/refill medication.  Patient was given instructions and counseling regarding his condition or for health maintenance advice. Please see specific information pulled into the AVS if appropriate.

## 2023-01-09 NOTE — ASSESSMENT & PLAN NOTE
Chronic kidney disease is showing interval improvement.  Normalization of creatinine noted on labs today.

## 2023-01-09 NOTE — ASSESSMENT & PLAN NOTE
LDL cholesterol above goal.  Change from pravastatin to rosuvastatin 10 mg bedtime dosing.  Recheck labs 6 months.

## 2023-01-09 NOTE — PATIENT INSTRUCTIONS
Cholesterol Content in Foods  Cholesterol is a waxy, fat-like substance that helps to carry fat in the blood. The body needs cholesterol in small amounts, but too much cholesterol can cause damage to the arteries and heart.  What foods have cholesterol?  Cholesterol is found in animal-based foods, such as meat, seafood, and dairy. Generally, low-fat dairy and lean meats have less cholesterol than full-fat dairy and fatty meats. The milligrams of cholesterol per serving (mg per serving) of common cholesterol-containing foods are listed below.  Meats and other proteins  Egg -- one large whole egg has 186 mg.  Veal shank -- 4 oz (113 g) has 141 mg.  Lean ground turkey (93% lean) -- 4 oz (113 g) has 118 mg.  Fat-trimmed lamb loin -- 4 oz (113 g) has 106 mg.  Lean ground beef (90% lean) -- 4 oz (113 g) has 100 mg.  Lobster -- 3.5 oz (99 g) has 90 mg.  Pork loin chops -- 4 oz (113 g) has 86 mg.  Canned salmon -- 3.5 oz (99 g) has 83 mg.  Fat-trimmed beef top loin -- 4 oz (113 g) has 78 mg.  Frankfurter -- 1 adriana (3.5 oz or 99 g) has 77 mg.  Crab -- 3.5 oz (99 g) has 71 mg.  Roasted chicken without skin, white meat -- 4 oz (113 g) has 66 mg.  Light bologna -- 2 oz (57 g) has 45 mg.  Deli-cut turkey -- 2 oz (57 g) has 31 mg.  Canned tuna -- 3.5 oz (99 g) has 31 mg.  Cates -- 1 oz (28 g) has 29 mg.  Oysters and mussels (raw) -- 3.5 oz (99 g) has 25 mg.  Mackerel -- 1 oz (28 g) has 22 mg.  Trout -- 1 oz (28 g) has 20 mg.  Pork sausage -- 1 link (1 oz or 28 g) has 17 mg.  Oxbow -- 1 oz (28 g) has 16 mg.  Tilapia -- 1 oz (28 g) has 14 mg.  Dairy  Soft-serve ice cream -- ½ cup (4 oz or 86 g) has 103 mg.  Whole-milk yogurt -- 1 cup (8 oz or 245 g) has 29 mg.  Cheddar cheese -- 1 oz (28 g) has 28 mg.  American cheese -- 1 oz (28 g) has 28 mg.  Whole milk -- 1 cup (8 oz or 250 mL) has 23 mg.  2% milk -- 1 cup (8 oz or 250 mL) has 18 mg.  Cream cheese -- 1 tablespoon (Tbsp) (14.5 g) has 15 mg.  Cottage cheese -- ½ cup (4 oz or 113  g) has 14 mg.  Low-fat (1%) milk -- 1 cup (8 oz or 250 mL) has 10 mg.  Sour cream -- 1 Tbsp (12 g) has 8.5 mg.  Low-fat yogurt -- 1 cup (8 oz or 245 g) has 8 mg.  Nonfat Greek yogurt -- 1 cup (8 oz or 228 g) has 7 mg.  Half-and-half cream -- 1 Tbsp (15 mL) has 5 mg.  Fats and oils  Cod liver oil -- 1 tablespoon (Tbsp) (13.6 g) has 82 mg.  Butter -- 1 Tbsp (14 g) has 15 mg.  Lard -- 1 Tbsp (12.8 g) has 14 mg.  Cates grease -- 1 Tbsp (12.9 g) has 14 mg.  Mayonnaise -- 1 Tbsp (13.8 g) has 5-10 mg.  Margarine -- 1 Tbsp (14 g) has 3-10 mg.  The items listed above may not be a complete list of foods with cholesterol. Exact amounts of cholesterol in these foods may vary depending on specific ingredients and brands. Contact a dietitian for more information.  What foods do not have cholesterol?  Most plant-based foods do not have cholesterol unless you combine them with a food that has cholesterol. Foods without cholesterol include:  Grains and cereals.  Vegetables.  Fruits.  Vegetable oils, such as olive, canola, and sunflower oil.  Legumes, such as peas, beans, and lentils.  Nuts and seeds.  Egg whites.  The items listed above may not be a complete list of foods that do not have cholesterol. Contact a dietitian for more information.  Summary  The body needs cholesterol in small amounts, but too much cholesterol can cause damage to the arteries and heart.  Cholesterol is found in animal-based foods, such as meat, seafood, and dairy. Generally, low-fat dairy and lean meats have less cholesterol than full-fat dairy and fatty meats.  This information is not intended to replace advice given to you by your health care provider. Make sure you discuss any questions you have with your health care provider.  Document Revised: 04/29/2022 Document Reviewed: 04/29/2022  Elsevier Patient Education © 2022 [employer]

## 2023-01-13 ENCOUNTER — OFFICE VISIT (OUTPATIENT)
Dept: ORTHOPEDIC SURGERY | Facility: CLINIC | Age: 58
End: 2023-01-13
Payer: COMMERCIAL

## 2023-01-13 VITALS — BODY MASS INDEX: 26.86 KG/M2 | HEIGHT: 75 IN | WEIGHT: 216 LBS | TEMPERATURE: 97.4 F

## 2023-01-13 DIAGNOSIS — M25.511 RIGHT SHOULDER PAIN, UNSPECIFIED CHRONICITY: Primary | ICD-10-CM

## 2023-01-13 DIAGNOSIS — M75.101 TEAR OF RIGHT ROTATOR CUFF, UNSPECIFIED TEAR EXTENT, UNSPECIFIED WHETHER TRAUMATIC: ICD-10-CM

## 2023-01-13 PROCEDURE — 20610 DRAIN/INJ JOINT/BURSA W/O US: CPT | Performed by: ORTHOPAEDIC SURGERY

## 2023-01-13 PROCEDURE — 99203 OFFICE O/P NEW LOW 30 MIN: CPT | Performed by: ORTHOPAEDIC SURGERY

## 2023-01-13 PROCEDURE — 73030 X-RAY EXAM OF SHOULDER: CPT | Performed by: ORTHOPAEDIC SURGERY

## 2023-01-13 RX ORDER — LIDOCAINE HYDROCHLORIDE 10 MG/ML
2 INJECTION, SOLUTION EPIDURAL; INFILTRATION; INTRACAUDAL; PERINEURAL
Status: COMPLETED | OUTPATIENT
Start: 2023-01-13 | End: 2023-01-13

## 2023-01-13 RX ORDER — METHYLPREDNISOLONE ACETATE 80 MG/ML
80 INJECTION, SUSPENSION INTRA-ARTICULAR; INTRALESIONAL; INTRAMUSCULAR; SOFT TISSUE
Status: COMPLETED | OUTPATIENT
Start: 2023-01-13 | End: 2023-01-13

## 2023-01-13 RX ADMIN — METHYLPREDNISOLONE ACETATE 80 MG: 80 INJECTION, SUSPENSION INTRA-ARTICULAR; INTRALESIONAL; INTRAMUSCULAR; SOFT TISSUE at 14:06

## 2023-01-13 RX ADMIN — LIDOCAINE HYDROCHLORIDE 2 ML: 10 INJECTION, SOLUTION EPIDURAL; INFILTRATION; INTRACAUDAL; PERINEURAL at 14:06

## 2023-01-13 NOTE — PROGRESS NOTES
New Shoulder      Patient: Rafita Gary        YOB: 1965    Medical Record Number: [id number]        Chief Complaints: Right shoulder pain      History of Present Illness: This is a 57-year-old right-hand-dominant male presents complaining of right shoulder pain is been ongoing for a long Leahy month and over the month its gotten worse he has been doing PT on his left upper extremity read some tendinitis and he thinks he is overusing his right no change in activity other than that he does have night pain no history of similar symptoms he did have a dislocation when he was a kid his symptoms are moderate intermittent aching worse with activity somewhat better with rest his past medical history is remarkable for those issues below and reviewed by me      Allergies:   Allergies   Allergen Reactions   • Doxycycline Rash       Medications:   Home Medications:  Current Outpatient Medications on File Prior to Visit   Medication Sig   • levothyroxine (SYNTHROID, LEVOTHROID) 100 MCG tablet Take 1 tablet by mouth Daily for 270 days.   • pantoprazole (PROTONIX) 40 MG EC tablet Take 1 tablet by mouth Every Night for 270 days.   • rosuvastatin (CRESTOR) 10 MG tablet Take 1 tablet by mouth Every Night for 270 days.     No current facility-administered medications on file prior to visit.     Current Medications:  Scheduled Meds:  Continuous Infusions:No current facility-administered medications for this visit.    PRN Meds:.    Past Medical History:   Diagnosis Date   • Allergic    • Chronic kidney disease    • Colonic polyp    • Diverticulosis    • Foreign body (FB) in soft tissue 06/11/2015    tick head in right lower back   • GERD (gastroesophageal reflux disease)    • Hyperlipidemia    • Hypothyroidism    • Lyme disease    • Other specified abnormal findings of blood chemistry 08/19/2019   • Renal insufficiency 2018   • Screening for prostate cancer 2011    digital prostate exam   • Sleep apnea     mild  "  • Tick bite    • Total bilirubin, elevated 2015        Past Surgical History:   Procedure Laterality Date   • COLONOSCOPY     • ENDOSCOPY  2012    Dr. Aldana; gastritis and esophagitis   • FRACTURE SURGERY  1989   • POLYSOMNOGRAPHY  2011    mild obst sleep apnea        Social History     Occupational History   • Not on file   Tobacco Use   • Smoking status: Former     Packs/day: 0.50     Years: 15.00     Pack years: 7.50     Types: Cigarettes     Start date: 1973     Quit date: 1988     Years since quittin.9   • Smokeless tobacco: Never   Vaping Use   • Vaping Use: Never used   Substance and Sexual Activity   • Alcohol use: No   • Drug use: No   • Sexual activity: Yes     Partners: Female     Birth control/protection: None      Social History     Social History Narrative   • Not on file        Family History   Problem Relation Age of Onset   • Thyroid disease Mother    • Alcohol abuse Father    • Cancer Father         colon   • Alcohol abuse Brother    • Alcohol abuse Brother              Review of Systems:     Review of Systems      Physical Exam: 57 y.o. male  General Appearance:    Alert, cooperative, in no acute distress                   Vitals:    23 1329   Temp: 97.4 °F (36.3 °C)   TempSrc: Temporal   Weight: 98 kg (216 lb)   Height: 190.5 cm (75\")      Patient is alert and read ×3 no acute distress appears her above-listed at height weight and age.  Affect is normal respiratory rate is normal unlabored. Heart rate regular rate rhythm, sclera, dentition and hearing are normal for the purpose of this exam.    Ortho Exam Physical exam of the right shoulder reveals no overlying skin changes no lymphedema no lymphadenopathy.  Patient has active flexion 180 with mild symptoms and some substitution abduction is similar external rotation is to 50 and internal rotation to the upper lumbar spine with mild symptoms.  Patient has good rotator cuff strength 4-4+ over 5 with " isometric strength testing with pain.  Patient has a positive impingement and a positive Colon sign.  Patient has good cervical range of motion which is full and asymptomatic no radicular symptoms.  Patient has a normal elbow exam.  Good distal pulses are present  Patient has pain with overhead activity and a positive Neer sign and a positive empty can sign , a positive drop arm and a definitive painful arc    Large Joint Arthrocentesis: R subacromial bursa  Date/Time: 1/13/2023 2:06 PM  Consent given by: patient  Site marked: site marked  Timeout: Immediately prior to procedure a time out was called to verify the correct patient, procedure, equipment, support staff and site/side marked as required   Supporting Documentation  Indications: pain   Procedure Details  Location: shoulder - R subacromial bursa  Preparation: Patient was prepped and draped in the usual sterile fashion  Needle gauge: 21G.  Approach: posterior  Medications administered: 80 mg methylPREDNISolone acetate 80 MG/ML; 2 mL lidocaine PF 1% 1 %  Patient tolerance: patient tolerated the procedure well with no immediate complications                Radiology:   AP, Scapular Y and Axillary Lateral of the right shoulder were ordered/reviewed to evauate shoulder pain.  I have no comparative films he has a tiny osteophyte on the anterior aspect of his glenoid and his humeral head he has acromioclavicular arthritis he appears to have a slightly high riding humeral head  Imaging Results (Most Recent)     Procedure Component Value Units Date/Time    XR Shoulder 2+ View Right [[id number]] Resulted: 01/13/23 1317     Updated: 01/13/23 1318    Impression:      Ordering physician's impression is located in the Encounter Note dated 01/13/23. X-ray performed in the DR room.          Assessment/Plan: Right shoulder pain I suspect this is rotator cuff in some fashion perhaps a longstanding tear or an acute on chronic plan is to proceed with an injection as a  diagnostic and therapeutic tool we will have him work with therapy like he did on the left 1 for fails to improve with that we will get an MRI  Cortisone Injection. See procedure note.  Cortisone Injection for DIAGNOSTIC and THERAPUTIC purposes.

## 2023-02-09 ENCOUNTER — TELEPHONE (OUTPATIENT)
Dept: ORTHOPEDIC SURGERY | Facility: CLINIC | Age: 58
End: 2023-02-09
Payer: COMMERCIAL

## 2023-02-09 DIAGNOSIS — M75.101 TEAR OF RIGHT ROTATOR CUFF, UNSPECIFIED TEAR EXTENT, UNSPECIFIED WHETHER TRAUMATIC: Primary | ICD-10-CM

## 2023-02-23 ENCOUNTER — HOSPITAL ENCOUNTER (OUTPATIENT)
Dept: MRI IMAGING | Facility: HOSPITAL | Age: 58
Discharge: HOME | End: 2023-02-23
Admitting: ORTHOPAEDIC SURGERY
Payer: COMMERCIAL

## 2023-02-23 DIAGNOSIS — M75.101 TEAR OF RIGHT ROTATOR CUFF, UNSPECIFIED TEAR EXTENT, UNSPECIFIED WHETHER TRAUMATIC: ICD-10-CM

## 2023-02-23 PROCEDURE — 73221 MRI JOINT UPR EXTREM W/O DYE: CPT

## 2023-02-28 ENCOUNTER — TELEPHONE (OUTPATIENT)
Dept: ORTHOPEDIC SURGERY | Facility: CLINIC | Age: 58
End: 2023-02-28
Payer: COMMERCIAL

## 2023-02-28 NOTE — TELEPHONE ENCOUNTER
----- Message from Cynthia Gonzalez MD sent at 2/28/2023  6:58 AM EST -----  Please let him know he does have a rotator cuff tear if it still bothering him I would recommend fixing it.  I would like him to follow-up for further discussion

## 2023-03-14 ENCOUNTER — OFFICE VISIT (OUTPATIENT)
Dept: ORTHOPEDIC SURGERY | Facility: CLINIC | Age: 58
End: 2023-03-14
Payer: COMMERCIAL

## 2023-03-14 VITALS — BODY MASS INDEX: 28.76 KG/M2 | WEIGHT: 224.1 LBS | HEIGHT: 74 IN | TEMPERATURE: 97.7 F

## 2023-03-14 DIAGNOSIS — M75.121 COMPLETE TEAR OF RIGHT ROTATOR CUFF, UNSPECIFIED WHETHER TRAUMATIC: Primary | ICD-10-CM

## 2023-03-14 PROCEDURE — 99214 OFFICE O/P EST MOD 30 MIN: CPT | Performed by: ORTHOPAEDIC SURGERY

## 2023-03-14 RX ORDER — CEFAZOLIN SODIUM 2 G/100ML
2 INJECTION, SOLUTION INTRAVENOUS ONCE
OUTPATIENT
Start: 2023-04-18 | End: 2023-03-14

## 2023-03-14 NOTE — PROGRESS NOTES
"Right Shoulder MRI Follow Up      Patient: Rafita Gary        YOB: 1965            Chief Complaints: Shoulder pain right      History of Present Illness: The patient is here follow-up of an MRI of the shoulder MRI demonstrates a full-thickness tear of the rotator cuff as well as a partial-thickness tear of the biceps tendon he states his shoulder hurts it bothers him enough he would like to get this fixed he is currently still able to work and would like this to do this after 2 trips he had his last one in June.      Physical Exam: 58 y.o. male  General Appearance:    Alert, cooperative, in no acute distress                 There were no vitals filed for this visit.     Patient is alert and read ×3 no acute distress appears her above-listed at height weight and age.  Affect is normal respiratory rate is normal unlabored. Heart rate regular rate rhythm, sclera, dentition and hearing are normal for the purpose of this exam.      Ortho Exam  Physical exam of the right shoulder reveals no overlying skin changes no lymphedema no lymphadenopathy.  Patient has active flexion 180 with mild symptoms abduction is similar external rotation is to 50 and internal rotation to the upper lumbar spine with mild symptoms.  Patient has good rotator cuff strength 4+ over 5 with isometric strength testing with pain.  Patient has a positive impingement and a positive Colon sign.  Patient has good cervical range of motion which is full and asymptomatic no radicular symptoms.  Patient has a normal elbow exam.  Good distal pulses are present  Patient has pain with overhead activity and a positive Neer sign and a positive empty can sign , a positive drop arm and a definitive painful arc  Physical Exam: 58 y.o. male  General Appearance:    Alert, cooperative, in no acute distress                      Vitals:    03/14/23 1434   Temp: 97.7 °F (36.5 °C)   Weight: 102 kg (224 lb 1.6 oz)   Height: 188 cm (74\")   PainSc:   6 "        Head:    Normocephalic, without obvious abnormality, atraumatic   Eyes:            conjunctivae and sclerae normal, no pallor, corneas clear,    Ears:    Ears appear intact with no abnormalities noted   Throat:   No oral lesions, no thrush, oral mucosa moist   Neck:   No adenopathy, supple, trachea midline, no thyromegaly,    Back:     No kyphosis present, no scoliosis present, no skin lesions,      erythema or scars, no tenderness to percussion or                   palpation,   range of motion normal   Lungs:     ,respirations regular, even and                  unlabored    Heart:    Regular rhythm and normal rate               Chest Wall:    No abnormalities observed               Pulses:   Pulses palpable and equal bilaterally   Skin:   No bleeding, bruising or rash   Lymph nodes:   No palpable adenopathy   Neurologic:   Appears neurologic intact       MRI Results: MRIs as above have reviewed the events myself    Findings  Procedures      Assessment/Plan:    Right rotator cuff tear plan is to proceed with repair we also visit has a partial biceps tendon tear we would then proceed with debridement biceps tenotomy biceps tenodesis which I discussed with patient.  We discussed risk benefits and alternatives The patient voiced understanding of the risks, benefits, and alternative forms of treatment that were discussed and the patient consents to proceed with the above listed surgery.  All risks, benefits and alternatives were discussed.  Risks including to but not exclusive to anesthetic complications, including death, MI, CVA, infection, bleeding DVT, fracture, residual pain and need for future surgery.  We discussed perioperative regimen time out of work which for him is probably going to be at least 6 months since he wants to wait till June on this I will have him follow-up right before the surgery just so we can discuss things again

## 2023-03-17 ENCOUNTER — PREP FOR SURGERY (OUTPATIENT)
Dept: OTHER | Facility: HOSPITAL | Age: 58
End: 2023-03-17
Payer: COMMERCIAL

## 2023-03-17 ENCOUNTER — TELEPHONE (OUTPATIENT)
Dept: ORTHOPEDIC SURGERY | Facility: CLINIC | Age: 58
End: 2023-03-17

## 2023-03-17 PROBLEM — M75.121 COMPLETE TEAR OF RIGHT ROTATOR CUFF: Status: ACTIVE | Noted: 2023-03-17

## 2023-03-17 NOTE — TELEPHONE ENCOUNTER
"  Caller: Rafita Gary \"Jamie\"    Relationship to patient: Self    Best call back number: [phone]    Chief complaint: RT SHOULDER     Type of visit: SURGERY     Requested date: ASAP      "

## 2023-03-31 DIAGNOSIS — K21.9 GASTROESOPHAGEAL REFLUX DISEASE WITHOUT ESOPHAGITIS: ICD-10-CM

## 2023-03-31 DIAGNOSIS — E03.9 ACQUIRED HYPOTHYROIDISM: ICD-10-CM

## 2023-03-31 DIAGNOSIS — E78.00 PURE HYPERCHOLESTEROLEMIA: ICD-10-CM

## 2023-04-03 RX ORDER — PANTOPRAZOLE SODIUM 40 MG/1
40 TABLET, DELAYED RELEASE ORAL NIGHTLY
Qty: 90 TABLET | Refills: 1 | Status: SHIPPED | OUTPATIENT
Start: 2023-04-03

## 2023-04-03 RX ORDER — ROSUVASTATIN CALCIUM 10 MG/1
10 TABLET, COATED ORAL NIGHTLY
Qty: 90 TABLET | Refills: 1 | Status: SHIPPED | OUTPATIENT
Start: 2023-04-03

## 2023-04-03 RX ORDER — LEVOTHYROXINE SODIUM 0.1 MG/1
100 TABLET ORAL DAILY
Qty: 90 TABLET | Refills: 1 | Status: SHIPPED | OUTPATIENT
Start: 2023-04-03

## 2023-04-03 NOTE — TELEPHONE ENCOUNTER
Rx Refill Note  Requested Prescriptions     Pending Prescriptions Disp Refills   • levothyroxine (SYNTHROID, LEVOTHROID) 100 MCG tablet 90 tablet 2     Sig: Take 1 tablet by mouth Daily for 270 days.   • pantoprazole (PROTONIX) 40 MG EC tablet 90 tablet 2     Sig: Take 1 tablet by mouth Every Night for 270 days.   • rosuvastatin (CRESTOR) 10 MG tablet 90 tablet 2     Sig: Take 1 tablet by mouth Every Night for 270 days.      Last office visit with prescribing clinician: 1/9/2023   Next office visit with prescribing clinician: Visit date not found

## 2023-04-07 ENCOUNTER — TELEPHONE (OUTPATIENT)
Dept: ORTHOPEDIC SURGERY | Facility: CLINIC | Age: 58
End: 2023-04-07

## 2023-04-07 NOTE — TELEPHONE ENCOUNTER
Caller: KIMBER HUITRON    Relationship to patient: SELF    Best call back number: [phone]    Patient is needing: PATIENT STATES THAT 04/11/2023 AT 12:00 P.M. WILL BE OKAY FOR HIS PREOP.  UNABLE TO WARM TRANSFER.

## 2023-04-11 ENCOUNTER — PRE-ADMISSION TESTING (OUTPATIENT)
Dept: PREADMISSION TESTING | Facility: HOSPITAL | Age: 58
End: 2023-04-11
Payer: COMMERCIAL

## 2023-04-11 VITALS
HEART RATE: 73 BPM | BODY MASS INDEX: 28.58 KG/M2 | SYSTOLIC BLOOD PRESSURE: 109 MMHG | RESPIRATION RATE: 16 BRPM | TEMPERATURE: 97.8 F | WEIGHT: 222.7 LBS | DIASTOLIC BLOOD PRESSURE: 72 MMHG | OXYGEN SATURATION: 96 % | HEIGHT: 74 IN

## 2023-04-11 LAB
ANION GAP SERPL CALCULATED.3IONS-SCNC: 7.9 MMOL/L (ref 5–15)
BUN SERPL-MCNC: 17 MG/DL (ref 8–23)
BUN/CREAT SERPL: 13.6 (ref 7–25)
CALCIUM SPEC-SCNC: 9.3 MG/DL (ref 8.6–10.5)
CHLORIDE SERPL-SCNC: 105 MMOL/L (ref 98–107)
CO2 SERPL-SCNC: 28.1 MMOL/L (ref 22–29)
CREAT SERPL-MCNC: 1.25 MG/DL (ref 0.76–1.27)
DEPRECATED RDW RBC AUTO: 43.1 FL (ref 37–54)
EGFRCR SERPLBLD CKD-EPI 2021: 65.9 ML/MIN/1.73
ERYTHROCYTE [DISTWIDTH] IN BLOOD BY AUTOMATED COUNT: 12.5 % (ref 12.3–15.4)
GLUCOSE SERPL-MCNC: 90 MG/DL (ref 65–99)
HCT VFR BLD AUTO: 40.3 % (ref 37.5–51)
HGB BLD-MCNC: 14.3 G/DL (ref 13–17.7)
MCH RBC QN AUTO: 33.3 PG (ref 26.6–33)
MCHC RBC AUTO-ENTMCNC: 35.5 G/DL (ref 31.5–35.7)
MCV RBC AUTO: 93.7 FL (ref 79–97)
PLATELET # BLD AUTO: 195 10*3/MM3 (ref 140–450)
PMV BLD AUTO: 9.8 FL (ref 6–12)
POTASSIUM SERPL-SCNC: 3.8 MMOL/L (ref 3.5–5.2)
QT INTERVAL: 371 MS
RBC # BLD AUTO: 4.3 10*6/MM3 (ref 4.14–5.8)
SODIUM SERPL-SCNC: 141 MMOL/L (ref 136–145)
WBC NRBC COR # BLD: 4.19 10*3/MM3 (ref 3.4–10.8)

## 2023-04-11 PROCEDURE — 36415 COLL VENOUS BLD VENIPUNCTURE: CPT

## 2023-04-11 PROCEDURE — 93010 ELECTROCARDIOGRAM REPORT: CPT | Performed by: INTERNAL MEDICINE

## 2023-04-11 PROCEDURE — 93005 ELECTROCARDIOGRAM TRACING: CPT

## 2023-04-11 PROCEDURE — 85027 COMPLETE CBC AUTOMATED: CPT

## 2023-04-11 PROCEDURE — 80048 BASIC METABOLIC PNL TOTAL CA: CPT

## 2023-04-11 RX ORDER — CETIRIZINE HYDROCHLORIDE 10 MG/1
10 TABLET ORAL DAILY
COMMUNITY

## 2023-04-11 NOTE — DISCHARGE INSTRUCTIONS
Take the following medications the morning of surgery:  PANTOPRAZOLE    THE HOSPITAL WILL CALL YOU THE DAY BEFORE WITH YOUR ARRIVAL TIME FOR SURGERY.    If you are on prescription narcotic pain medication to control your pain you may also take that medication the morning of surgery.    General Instructions:  Do not eat solid food after midnight the night before surgery.  You may drink clear liquids day of surgery but must stop at least one hour before your hospital arrival time.  It is beneficial for you to have a clear drink that contains carbohydrates the day of surgery.  We suggest a 12 to 20 ounce bottle of Gatorade or Powerade for non-diabetic patients or a 12 to 20 ounce bottle of G2 or Powerade Zero for diabetic patients. (Pediatric patients, are not advised to drink a 12 to 20 ounce carbohydrate drink)    Clear liquids are liquids you can see through.  Nothing red in color.     Plain water                               Sports drinks  Sodas                                   Gelatin (Jell-O)  Fruit juices without pulp such as white grape juice and apple juice  Popsicles that contain no fruit or yogurt  Tea or coffee (no cream or milk added)  Gatorade / Powerade  G2 / Powerade Zero    Infants may have breast milk up to four hours before surgery.  Infants drinking formula may drink formula up to six hours before surgery.   Patients who avoid smoking, chewing tobacco and alcohol for 4 weeks prior to surgery have a reduced risk of post-operative complications.  Quit smoking as many days before surgery as you can.  Do not smoke, use chewing tobacco or drink alcohol the day of surgery.   If applicable bring your C-PAP/ BI-PAP machine in with you to preop day of surgery.  Bring any papers given to you in the doctor’s office.  Wear clean comfortable clothes.  Do not wear contact lenses, false eyelashes or make-up.  Bring a case for your glasses.   Bring crutches or walker if applicable.  Remove all piercings.  Leave  jewelry and any other valuables at home.  Hair extensions with metal clips must be removed prior to surgery.  The Pre-Admission Testing nurse will instruct you to bring medications if unable to obtain an accurate list in Pre-Admission Testing.        If you were given a blood bank ID arm band remember to bring it with you the day of surgery.    Preventing a Surgical Site Infection:  For 2 to 3 days before surgery, avoid shaving with a razor because the razor can irritate skin and make it easier to develop an infection.    Any areas of open skin can increase the risk of a post-operative wound infection by allowing bacteria to enter and travel throughout the body.  Notify your surgeon if you have any skin wounds / rashes even if it is not near the expected surgical site.  The area will need assessed to determine if surgery should be delayed until it is healed.  The night prior to surgery shower using a fresh bar of anti-bacterial soap (such as Dial) and clean washcloth.  Sleep in a clean bed with clean clothing.  Do not allow pets to sleep with you.  Shower on the morning of surgery using a fresh bar of anti-bacterial soap (such as Dial) and clean washcloth.  Dry with a clean towel and dress in clean clothing.  Ask your surgeon if you will be receiving antibiotics prior to surgery.  Make sure you, your family, and all healthcare providers clean their hands with soap and water or an alcohol based hand  before caring for you or your wound.    Day of surgery:  Your arrival time is approximately two hours before your scheduled surgery time.  Upon arrival, a Pre-op nurse and Anesthesiologist will review your health history, obtain vital signs, and answer questions you may have.  The only belongings needed at this time will be a list of your home medications and if applicable your C-PAP/BI-PAP machine.  A Pre-op nurse will start an IV and you may receive medication in preparation for surgery, including something to  help you relax.     Please be aware that surgery does come with discomfort.  We want to make every effort to control your discomfort so please discuss any uncontrolled symptoms with your nurse.   Your doctor will most likely have prescribed pain medications.      If you are going home after surgery you will receive individualized written care instructions before being discharged.  A responsible adult must drive you to and from the hospital on the day of your surgery and stay with you for 24 hours.  Discharge prescriptions can be filled by the hospital pharmacy during regular pharmacy hours.  If you are having surgery late in the day/evening your prescription may be e-prescribed to your pharmacy.  Please verify your pharmacy hours or chose a 24 hour pharmacy to avoid not having access to your prescription because your pharmacy has closed for the day.    If you are staying overnight following surgery, you will be transported to your hospital room following the recovery period.  [facility] has all private rooms.    If you have any questions please call Pre-Admission Testing at [phone].  Deductibles and co-payments are collected on the day of service. Please be prepared to pay the required co-pay, deductible or deposit on the day of service as defined by your plan.    Call your surgeon immediately if you experience any of the following symptoms:  Sore Throat  Shortness of Breath or difficulty breathing  Cough  Chills  Body soreness or muscle pain  Headache  Fever  New loss of taste or smell  Do not arrive for your surgery ill.  Your procedure will need to be rescheduled to another time.  You will need to call your physician before the day of surgery to avoid any unnecessary exposure to hospital staff as well as other patients.

## 2023-04-19 ENCOUNTER — ANESTHESIA (OUTPATIENT)
Dept: PERIOP | Facility: HOSPITAL | Age: 58
End: 2023-04-19
Payer: COMMERCIAL

## 2023-04-19 ENCOUNTER — HOSPITAL ENCOUNTER (OUTPATIENT)
Facility: HOSPITAL | Age: 58
Setting detail: HOSPITAL OUTPATIENT SURGERY
Discharge: HOME | End: 2023-04-19
Attending: ORTHOPAEDIC SURGERY | Admitting: ORTHOPAEDIC SURGERY
Payer: COMMERCIAL

## 2023-04-19 ENCOUNTER — ANESTHESIA EVENT (OUTPATIENT)
Dept: PERIOP | Facility: HOSPITAL | Age: 58
End: 2023-04-19
Payer: COMMERCIAL

## 2023-04-19 VITALS
OXYGEN SATURATION: 99 % | HEIGHT: 74 IN | BODY MASS INDEX: 28.58 KG/M2 | WEIGHT: 222.66 LBS | HEART RATE: 71 BPM | DIASTOLIC BLOOD PRESSURE: 79 MMHG | TEMPERATURE: 97.9 F | RESPIRATION RATE: 16 BRPM | SYSTOLIC BLOOD PRESSURE: 126 MMHG

## 2023-04-19 DIAGNOSIS — M75.121 COMPLETE TEAR OF RIGHT ROTATOR CUFF, UNSPECIFIED WHETHER TRAUMATIC: ICD-10-CM

## 2023-04-19 PROCEDURE — 25010000002 DEXAMETHASONE PER 1 MG: Performed by: ANESTHESIOLOGY

## 2023-04-19 PROCEDURE — 25010000002 MIDAZOLAM PER 1 MG: Performed by: ANESTHESIOLOGY

## 2023-04-19 PROCEDURE — 25010000002 ROPIVACAINE PER 1 MG: Performed by: ANESTHESIOLOGY

## 2023-04-19 PROCEDURE — 25010000002 METHYLPREDNISOLONE PER 80 MG: Performed by: ORTHOPAEDIC SURGERY

## 2023-04-19 PROCEDURE — C1713 ANCHOR/SCREW BN/BN,TIS/BN: HCPCS | Performed by: ORTHOPAEDIC SURGERY

## 2023-04-19 PROCEDURE — 25010000002 CEFAZOLIN IN DEXTROSE 2-4 GM/100ML-% SOLUTION: Performed by: ORTHOPAEDIC SURGERY

## 2023-04-19 PROCEDURE — 25010000002 DEXAMETHASONE SODIUM PHOSPHATE 20 MG/5ML SOLUTION: Performed by: NURSE ANESTHETIST, CERTIFIED REGISTERED

## 2023-04-19 PROCEDURE — 25010000002 PROPOFOL 10 MG/ML EMULSION: Performed by: NURSE ANESTHETIST, CERTIFIED REGISTERED

## 2023-04-19 PROCEDURE — 25010000002 EPINEPHRINE PER 0.1 MG: Performed by: ORTHOPAEDIC SURGERY

## 2023-04-19 PROCEDURE — 25010000002 FENTANYL CITRATE (PF) 50 MCG/ML SOLUTION: Performed by: ANESTHESIOLOGY

## 2023-04-19 PROCEDURE — 25010000002 ONDANSETRON PER 1 MG: Performed by: NURSE ANESTHETIST, CERTIFIED REGISTERED

## 2023-04-19 PROCEDURE — 25010000002 CEFAZOLIN PER 500 MG: Performed by: ORTHOPAEDIC SURGERY

## 2023-04-19 DEVICE — TKL SPDBRG IMP SYS W/ BC SWVLK & NDL
Type: IMPLANTABLE DEVICE | Site: SHOULDER | Status: FUNCTIONAL
Brand: ARTHREX®

## 2023-04-19 DEVICE — FW BPB #2 SUTR,1 BLU,1 WH/BLK
Type: IMPLANTABLE DEVICE | Site: SHOULDER | Status: FUNCTIONAL
Brand: ARTHREX®

## 2023-04-19 RX ORDER — HYDROCODONE BITARTRATE AND ACETAMINOPHEN 7.5; 325 MG/1; MG/1
1 TABLET ORAL ONCE AS NEEDED
Status: DISCONTINUED | OUTPATIENT
Start: 2023-04-19 | End: 2023-04-20 | Stop reason: HOSPADM

## 2023-04-19 RX ORDER — NALOXONE HCL 0.4 MG/ML
0.2 VIAL (ML) INJECTION AS NEEDED
Status: DISCONTINUED | OUTPATIENT
Start: 2023-04-19 | End: 2023-04-20 | Stop reason: HOSPADM

## 2023-04-19 RX ORDER — LABETALOL HYDROCHLORIDE 5 MG/ML
5 INJECTION, SOLUTION INTRAVENOUS
Status: DISCONTINUED | OUTPATIENT
Start: 2023-04-19 | End: 2023-04-20 | Stop reason: HOSPADM

## 2023-04-19 RX ORDER — PROMETHAZINE HYDROCHLORIDE 25 MG/1
25 TABLET ORAL ONCE AS NEEDED
Status: DISCONTINUED | OUTPATIENT
Start: 2023-04-19 | End: 2023-04-20 | Stop reason: HOSPADM

## 2023-04-19 RX ORDER — IPRATROPIUM BROMIDE AND ALBUTEROL SULFATE 2.5; .5 MG/3ML; MG/3ML
3 SOLUTION RESPIRATORY (INHALATION) ONCE AS NEEDED
Status: DISCONTINUED | OUTPATIENT
Start: 2023-04-19 | End: 2023-04-20 | Stop reason: HOSPADM

## 2023-04-19 RX ORDER — SODIUM CHLORIDE 0.9 % (FLUSH) 0.9 %
3 SYRINGE (ML) INJECTION EVERY 12 HOURS SCHEDULED
Status: DISCONTINUED | OUTPATIENT
Start: 2023-04-19 | End: 2023-04-19 | Stop reason: HOSPADM

## 2023-04-19 RX ORDER — DROPERIDOL 2.5 MG/ML
0.62 INJECTION, SOLUTION INTRAMUSCULAR; INTRAVENOUS
Status: DISCONTINUED | OUTPATIENT
Start: 2023-04-19 | End: 2023-04-20 | Stop reason: HOSPADM

## 2023-04-19 RX ORDER — EPHEDRINE SULFATE 50 MG/ML
5 INJECTION, SOLUTION INTRAVENOUS ONCE AS NEEDED
Status: DISCONTINUED | OUTPATIENT
Start: 2023-04-19 | End: 2023-04-20 | Stop reason: HOSPADM

## 2023-04-19 RX ORDER — ROPIVACAINE HYDROCHLORIDE 5 MG/ML
INJECTION, SOLUTION EPIDURAL; INFILTRATION; PERINEURAL
Status: COMPLETED | OUTPATIENT
Start: 2023-04-19 | End: 2023-04-19

## 2023-04-19 RX ORDER — HYDROMORPHONE HYDROCHLORIDE 1 MG/ML
0.5 INJECTION, SOLUTION INTRAMUSCULAR; INTRAVENOUS; SUBCUTANEOUS
Status: DISCONTINUED | OUTPATIENT
Start: 2023-04-19 | End: 2023-04-20 | Stop reason: HOSPADM

## 2023-04-19 RX ORDER — SODIUM CHLORIDE 0.9 % (FLUSH) 0.9 %
3-10 SYRINGE (ML) INJECTION AS NEEDED
Status: DISCONTINUED | OUTPATIENT
Start: 2023-04-19 | End: 2023-04-19 | Stop reason: HOSPADM

## 2023-04-19 RX ORDER — LIDOCAINE HYDROCHLORIDE 10 MG/ML
0.5 INJECTION, SOLUTION EPIDURAL; INFILTRATION; INTRACAUDAL; PERINEURAL ONCE AS NEEDED
Status: DISCONTINUED | OUTPATIENT
Start: 2023-04-19 | End: 2023-04-19 | Stop reason: HOSPADM

## 2023-04-19 RX ORDER — DEXAMETHASONE SODIUM PHOSPHATE 4 MG/ML
INJECTION, SOLUTION INTRA-ARTICULAR; INTRALESIONAL; INTRAMUSCULAR; INTRAVENOUS; SOFT TISSUE
Status: COMPLETED | OUTPATIENT
Start: 2023-04-19 | End: 2023-04-19

## 2023-04-19 RX ORDER — PROMETHAZINE HYDROCHLORIDE 25 MG/1
25 SUPPOSITORY RECTAL ONCE AS NEEDED
Status: DISCONTINUED | OUTPATIENT
Start: 2023-04-19 | End: 2023-04-20 | Stop reason: HOSPADM

## 2023-04-19 RX ORDER — WOUND DRESSING ADHESIVE - LIQUID
LIQUID MISCELLANEOUS AS NEEDED
Status: DISCONTINUED | OUTPATIENT
Start: 2023-04-19 | End: 2023-04-19 | Stop reason: HOSPADM

## 2023-04-19 RX ORDER — DIPHENHYDRAMINE HYDROCHLORIDE 50 MG/ML
12.5 INJECTION INTRAMUSCULAR; INTRAVENOUS
Status: DISCONTINUED | OUTPATIENT
Start: 2023-04-19 | End: 2023-04-20 | Stop reason: HOSPADM

## 2023-04-19 RX ORDER — FLUMAZENIL 0.1 MG/ML
0.2 INJECTION INTRAVENOUS AS NEEDED
Status: DISCONTINUED | OUTPATIENT
Start: 2023-04-19 | End: 2023-04-20 | Stop reason: HOSPADM

## 2023-04-19 RX ORDER — MIDAZOLAM HYDROCHLORIDE 1 MG/ML
1 INJECTION INTRAMUSCULAR; INTRAVENOUS
Status: DISCONTINUED | OUTPATIENT
Start: 2023-04-19 | End: 2023-04-19 | Stop reason: HOSPADM

## 2023-04-19 RX ORDER — ONDANSETRON 2 MG/ML
INJECTION INTRAMUSCULAR; INTRAVENOUS AS NEEDED
Status: DISCONTINUED | OUTPATIENT
Start: 2023-04-19 | End: 2023-04-19 | Stop reason: SURG

## 2023-04-19 RX ORDER — OXYCODONE HYDROCHLORIDE AND ACETAMINOPHEN 5; 325 MG/1; MG/1
1 TABLET ORAL EVERY 4 HOURS PRN
Qty: 45 TABLET | Refills: 0 | Status: SHIPPED | OUTPATIENT
Start: 2023-04-19

## 2023-04-19 RX ORDER — FENTANYL CITRATE 50 UG/ML
50 INJECTION, SOLUTION INTRAMUSCULAR; INTRAVENOUS
Status: DISCONTINUED | OUTPATIENT
Start: 2023-04-19 | End: 2023-04-20 | Stop reason: HOSPADM

## 2023-04-19 RX ORDER — ONDANSETRON 2 MG/ML
4 INJECTION INTRAMUSCULAR; INTRAVENOUS ONCE AS NEEDED
Status: DISCONTINUED | OUTPATIENT
Start: 2023-04-19 | End: 2023-04-20 | Stop reason: HOSPADM

## 2023-04-19 RX ORDER — PROPOFOL 10 MG/ML
VIAL (ML) INTRAVENOUS AS NEEDED
Status: DISCONTINUED | OUTPATIENT
Start: 2023-04-19 | End: 2023-04-19 | Stop reason: SURG

## 2023-04-19 RX ORDER — METHYLPREDNISOLONE ACETATE 80 MG/ML
INJECTION, SUSPENSION INTRA-ARTICULAR; INTRALESIONAL; INTRAMUSCULAR; SOFT TISSUE AS NEEDED
Status: DISCONTINUED | OUTPATIENT
Start: 2023-04-19 | End: 2023-04-19 | Stop reason: HOSPADM

## 2023-04-19 RX ORDER — LIDOCAINE HYDROCHLORIDE 20 MG/ML
INJECTION, SOLUTION INFILTRATION; PERINEURAL AS NEEDED
Status: DISCONTINUED | OUTPATIENT
Start: 2023-04-19 | End: 2023-04-19 | Stop reason: SURG

## 2023-04-19 RX ORDER — BUPIVACAINE HYDROCHLORIDE AND EPINEPHRINE 2.5; 5 MG/ML; UG/ML
INJECTION, SOLUTION EPIDURAL; INFILTRATION; INTRACAUDAL; PERINEURAL AS NEEDED
Status: DISCONTINUED | OUTPATIENT
Start: 2023-04-19 | End: 2023-04-19 | Stop reason: HOSPADM

## 2023-04-19 RX ORDER — SODIUM CHLORIDE, SODIUM LACTATE, POTASSIUM CHLORIDE, CALCIUM CHLORIDE 600; 310; 30; 20 MG/100ML; MG/100ML; MG/100ML; MG/100ML
9 INJECTION, SOLUTION INTRAVENOUS CONTINUOUS
Status: DISCONTINUED | OUTPATIENT
Start: 2023-04-19 | End: 2023-04-20 | Stop reason: HOSPADM

## 2023-04-19 RX ORDER — DEXAMETHASONE SODIUM PHOSPHATE 4 MG/ML
INJECTION, SOLUTION INTRA-ARTICULAR; INTRALESIONAL; INTRAMUSCULAR; INTRAVENOUS; SOFT TISSUE AS NEEDED
Status: DISCONTINUED | OUTPATIENT
Start: 2023-04-19 | End: 2023-04-19 | Stop reason: SURG

## 2023-04-19 RX ORDER — ONDANSETRON 4 MG/1
4 TABLET, FILM COATED ORAL EVERY 8 HOURS PRN
Qty: 20 TABLET | Refills: 0 | Status: SHIPPED | OUTPATIENT
Start: 2023-04-19

## 2023-04-19 RX ORDER — EPHEDRINE SULFATE 50 MG/ML
INJECTION INTRAVENOUS AS NEEDED
Status: DISCONTINUED | OUTPATIENT
Start: 2023-04-19 | End: 2023-04-19 | Stop reason: SURG

## 2023-04-19 RX ORDER — FAMOTIDINE 10 MG/ML
20 INJECTION, SOLUTION INTRAVENOUS ONCE
Status: COMPLETED | OUTPATIENT
Start: 2023-04-19 | End: 2023-04-19

## 2023-04-19 RX ORDER — OXYCODONE AND ACETAMINOPHEN 7.5; 325 MG/1; MG/1
1 TABLET ORAL EVERY 4 HOURS PRN
Status: DISCONTINUED | OUTPATIENT
Start: 2023-04-19 | End: 2023-04-20 | Stop reason: HOSPADM

## 2023-04-19 RX ORDER — CEFAZOLIN SODIUM 2 G/100ML
2 INJECTION, SOLUTION INTRAVENOUS ONCE
Status: COMPLETED | OUTPATIENT
Start: 2023-04-19 | End: 2023-04-19

## 2023-04-19 RX ORDER — FENTANYL CITRATE 50 UG/ML
50 INJECTION, SOLUTION INTRAMUSCULAR; INTRAVENOUS
Status: DISCONTINUED | OUTPATIENT
Start: 2023-04-19 | End: 2023-04-19 | Stop reason: HOSPADM

## 2023-04-19 RX ORDER — HYDRALAZINE HYDROCHLORIDE 20 MG/ML
5 INJECTION INTRAMUSCULAR; INTRAVENOUS
Status: DISCONTINUED | OUTPATIENT
Start: 2023-04-19 | End: 2023-04-20 | Stop reason: HOSPADM

## 2023-04-19 RX ADMIN — DEXAMETHASONE SODIUM PHOSPHATE 4 MG: 4 INJECTION, SOLUTION INTRAMUSCULAR; INTRAVENOUS at 07:25

## 2023-04-19 RX ADMIN — SODIUM CHLORIDE, POTASSIUM CHLORIDE, SODIUM LACTATE AND CALCIUM CHLORIDE 9 ML/HR: 600; 310; 30; 20 INJECTION, SOLUTION INTRAVENOUS at 07:35

## 2023-04-19 RX ADMIN — FAMOTIDINE 20 MG: 10 INJECTION INTRAVENOUS at 07:34

## 2023-04-19 RX ADMIN — CEFAZOLIN SODIUM 2 G: 2 INJECTION, SOLUTION INTRAVENOUS at 07:35

## 2023-04-19 RX ADMIN — FENTANYL CITRATE 50 MCG: 50 INJECTION, SOLUTION INTRAMUSCULAR; INTRAVENOUS at 07:29

## 2023-04-19 RX ADMIN — ONDANSETRON 4 MG: 2 INJECTION INTRAMUSCULAR; INTRAVENOUS at 08:53

## 2023-04-19 RX ADMIN — MIDAZOLAM 1 MG: 1 INJECTION INTRAMUSCULAR; INTRAVENOUS at 07:26

## 2023-04-19 RX ADMIN — DEXAMETHASONE SODIUM PHOSPHATE 8 MG: 4 INJECTION, SOLUTION INTRAMUSCULAR; INTRAVENOUS at 07:53

## 2023-04-19 RX ADMIN — LIDOCAINE HYDROCHLORIDE 100 MG: 20 INJECTION, SOLUTION INFILTRATION; PERINEURAL at 07:46

## 2023-04-19 RX ADMIN — ROPIVACAINE HYDROCHLORIDE 20 ML: 5 INJECTION EPIDURAL; INFILTRATION; PERINEURAL at 07:25

## 2023-04-19 RX ADMIN — PROPOFOL 200 MG: 10 INJECTION, EMULSION INTRAVENOUS at 07:46

## 2023-04-19 RX ADMIN — EPHEDRINE SULFATE 5 MG: 50 INJECTION INTRAVENOUS at 08:36

## 2023-04-19 NOTE — ANESTHESIA PROCEDURE NOTES
Airway  Urgency: elective    Date/Time: 4/19/2023 7:48 AM    General Information and Staff    Patient location during procedure: OR  Anesthesiologist: Stevie Huerta MD  CRNA/CAA: Berny Atkinson CRNA    Indications and Patient Condition  Indications for airway management: airway protection    Preoxygenated: yes  MILS maintained throughout  Mask difficulty assessment: 2 - vent by mask + OA or adjuvant +/- NMBA    Final Airway Details  Final airway type: supraglottic airway      Successful airway: unique  Size 5     Number of attempts at approach: 1  Assessment: lips, teeth, and gum same as pre-op and atraumatic intubation

## 2023-04-19 NOTE — H&P
History & Physical       Patient: Rafita Gary    Date of Admission: 4/19/2023  6:56 AM    YOB: 1965    Medical Record Number: [id number]    Attending Physician: Cynthia Gonzalez MD        Chief Complaints: Complete tear of right rotator cuff, unspecified whether traumatic [M75.121]      History of Present Illness: This patient has a several month history of right shoulder pain he has an exam and an MRI which are consistent with rotator cuff tear.  He does also have some arthritis but he is only 58 our plan is to proceed with rotator cuff repair.  He understands in the event I find other pathology at time of surgery would address it as deemed appropriate     Allergies:   Allergies   Allergen Reactions   • Doxycycline Rash       Medications:   Home Medications:  No current facility-administered medications on file prior to encounter.     No current outpatient medications on file prior to encounter.     Current Medications:  Scheduled Meds:ceFAZolin, 2 g, Intravenous, Once      Continuous Infusions:   PRN Meds:.    Past Medical History:   Diagnosis Date   • Allergic    • Ankle sprain     Kid   • Chronic kidney disease     STAGE 2   • Colonic polyp    • Dislocation, shoulder     Same time as humerus break   • Diverticulosis    • Foreign body (FB) in soft tissue 06/11/2015    tick head in right lower back   • Fracture, fibula 01/12/1989   • Fracture, finger 06/23/1976    Smashed middle finger broke the tip (2 times)   • Fracture, humerus     Not sure, I was maybe 10   • Fracture, tibia and fibula 01/12/1989    Compound fracture both bones lower right leg   • GERD (gastroesophageal reflux disease)    • History of pneumothorax    • Hyperlipidemia    • Hypothyroidism    • Lumbosacral disc disease     Couple of times lower back bulging disc( PT)   • Lyme disease    • Other specified abnormal findings of blood chemistry 08/19/2019   • PONV (postoperative nausea and vomiting)    • Renal insufficiency     • Right shoulder pain     LIMITED ROM   • Rotator cuff syndrome 2022   • Screening for prostate cancer     digital prostate exam   • Sleep apnea     mild, NO CPAP   • Slow to wake up after anesthesia     AFTER FRACTURE SURGERY   • Tick bite    • Total bilirubin, elevated 2015        Past Surgical History:   Procedure Laterality Date   • COLONOSCOPY     • ENDOSCOPY  2012    Dr. Aldana; gastritis and esophagitis   • FRACTURE SURGERY  1989   • HAND SURGERY      First time I smashed middle finger   • POLYSOMNOGRAPHY  2011    mild obst sleep apnea        Social History     Occupational History   • Not on file   Tobacco Use   • Smoking status: Former     Packs/day: 0.50     Years: 15.00     Pack years: 7.50     Types: Cigarettes     Start date: 1973     Quit date: 1988     Years since quittin.1   • Smokeless tobacco: Never   Vaping Use   • Vaping Use: Never used   Substance and Sexual Activity   • Alcohol use: Never   • Drug use: Never   • Sexual activity: Yes     Partners: Female     Birth control/protection: None      Social History     Social History Narrative   • Not on file        Family History   Problem Relation Age of Onset   • Thyroid disease Mother    • Alcohol abuse Father    • Cancer Father         colon   • Alcohol abuse Brother    • Alcohol abuse Brother    • Malig Hyperthermia Neg Hx        Review of Systems      Physical Exam: 58 y.o. male  General Appearance:    Alert, cooperative, in no acute distress                    There were no vitals filed for this visit.     Head:  Normocephalic, without obvious abnormality, atraumatic   Eyes:          Conjunctivae and sclerae normal, no pallor, corneas clear,    Ears:  Ears appear intact with no abnormalities noted   Throat: No oral lesions, no thrush, oral mucosa moist   Neck: No adenopathy, supple, trachea midline, no thyromegaly,    Back:   No kyphosis present, no scoliosis present, no skin lesions,       erythema or scars, no tenderness to percussion or                   palpation,range of motion normal   Lungs:   C respirations regular, even and                 unlabored    Heart:  Regular rhythm and normal rate               Chest Wall:  No abnormalities observed               Pulses: Pulses palpable and equal bilaterally   Skin: No bleeding, bruising or rash   Lymph nodes: No palpable adenopathy   Neurologic: Appears neurologic intact             Assessment:  Patient Active Problem List   Diagnosis   • Acquired hypothyroidism   • Gastroesophageal reflux disease without esophagitis   • Pure hypercholesterolemia   • Raynaud's disease   • Swelling of 2nd and 3rd toes of left foot   • Nocturia   • Stage 3 chronic kidney disease   • History of COVID-19   • Acute bursitis of right shoulder   • Complete tear of right rotator cuff           Plan: All risks, benefits and alternatives were discussed.  Risks including but not exclusive to anesthetic complications, including death, MI, CVA, infection, bleeding DVT, PE,  fracture, residual pain and need for future surgery.  Patient understood all and agrees to proceed.

## 2023-04-19 NOTE — ANESTHESIA PREPROCEDURE EVALUATION
Anesthesia Evaluation     history of anesthetic complications: PONV               Airway   Mallampati: I  TM distance: >3 FB  No difficulty expected  Dental      Pulmonary    (+) sleep apnea,   Cardiovascular   Exercise tolerance: good (4-7 METS)    (+) hyperlipidemia,       Neuro/Psych  GI/Hepatic/Renal/Endo    (+)  GERD,  renal disease, thyroid problem hypothyroidism    Musculoskeletal     Abdominal    Substance History      OB/GYN          Other                        Anesthesia Plan    ASA 3     general     ([facility] for [facility]  )  intravenous induction     Anesthetic plan, risks, benefits, and alternatives have been provided, discussed and informed consent has been obtained with: patient.    Plan discussed with CRNA.        CODE STATUS:

## 2023-04-19 NOTE — ANESTHESIA PROCEDURE NOTES
Peripheral Block      Patient reassessed immediately prior to procedure    Patient location during procedure: pre-op  Start time: 4/19/2023 7:25 AM  Stop time: 4/19/2023 7:30 AM  Reason for block: procedure for pain, at surgeon's request and post-op pain management  Performed by  Anesthesiologist: Tarun Hutchinson MD  Preanesthetic Checklist  Completed: patient identified, IV checked, site marked, risks and benefits discussed, surgical consent, monitors and equipment checked, pre-op evaluation and timeout performed  Prep:  Pt Position: supine  Sterile barriers:cap, gloves, sterile barriers, washed/disinfected hands and mask  Prep: ChloraPrep  Patient monitoring: blood pressure monitoring, continuous pulse oximetry and EKG  Procedure    Sedation: yes  Performed under: local infiltration  Guidance:ultrasound guided  Images:still images obtained, printed/placed on chart    Laterality:right  Block Type:interscalene  Injection Technique:single-shot  Needle Type:echogenic  Resistance on Injection: none    Medications Used: dexamethasone (DECADRON) injection - Injection   4 mg - 4/19/2023 7:25:00 AM  ropivacaine (NAROPIN) 0.5 % injection - Injection   20 mL - 4/19/2023 7:25:00 AM      Post Assessment  Injection Assessment: negative aspiration for heme, no paresthesia on injection and incremental injection  Patient Tolerance:comfortable throughout block  Complications:no  Additional Notes  USG used to verify needle placement and medication administration

## 2023-04-19 NOTE — DISCHARGE INSTRUCTIONS
What to expect after a Nerve Block    Nerve blocks administered to block pain affect many types of nerves, including those nerves that control movement, pain, and normal sensation. Following a nerve block, you may notice some bruising at the site where the block was given. You may experience sensations such as: numbness of the affected area or limb, tingling, heaviness (that is the limb feels heavy to you), weakness or inability to move the affected arm or leg, or a feeling as if your arm or leg has “fallen asleep.”     A nerve block can last from 2 to 36 hours depending on the medications used.  Usually the weakness wears off first followed by the tingling and heaviness. As the block wears off, you may begin to notice pain; however, this sequence of events may occur in any order. Typically, you will be able to move your limb before you will feel it. Once a nerve block begins to wear off, the effects are usually completely gone within 60 minutes.  If you experience continued side effects that you believe are block related for longer than 48 hours, please call your healthcare provider. Please see block-specific instructions below.    Instructions for any block involving the shoulder or arm  If you have had any kind of shoulder/arm block, you will go home with your arm in a sling. Wear the sling until the block has completely worn off. You may be required to wear it for a longer period of time per your surgeon’s recommendations.  If you have had a shoulder/arm block, it is a good idea to sleep on a recliner with pillows under your arm.    You may experience symptoms such as:  Shortness of breath  Hoarseness   Blurry vision  Unequal pupils  Drooping of your face on the same side as the block was performed    These are side effects associated with this kind of block and should go away within 12 hours.    Note: If you have severe or prolonged shortness of breath, please seek medical assistance as soon as possible.      Protection of a “blocked” arm or leg (limb)  After a nerve block, you cannot feel pain, pressure, or extremes of temperature in the affected limb. And because of this, your blocked limb is at more risk for injury. For example, it is possible to burn your limb on an extremely hot surface without feeling it.     When resting, it is important to reposition your limb periodically to avoid prolonged pressure on it. This may require the use of pillows and padding.    While sleeping, you should avoid rolling onto the affected limb or putting too much pressure on it.     If you have a cast or tight dressing, check the color of your fingers or toes of the affected limb. Call your surgeon if they look discolored (that is, dusky, dark colored).    Use caution in cold weather. Cover your limb appropriately to protect it from the cold.      Pain Management:    Your surgeon will give you a prescription for pain medication. Begin taking this before the nerve block wears off. Bear in mind that sometimes the block can wear off in the middle of the night.        Pendulum Exercises for Post Op Shoulder Surgery      Lean over with your good arm supported on a table or chair.  Relax the injured arm and allow it to hang straight down at your side.  Slowly begin to swing the relaxed arm back and forth.  Then swing it side to side.  Next, move it in a [place]. Now,  reverse the direction.        Generally, you should spend about 5 minutes doing this exercise.  It should be done 2-3 times daily or as directed by your physician.

## 2023-04-19 NOTE — OP NOTE
Rotator Cuff RepairOperative Note      Facility: [facility]  Patient Name: Rafita Gary  YOB: 1965  Date: 4/19/2023  Medical Record Number: [id number]      Pre-op Diagnosis:   Complete tear of right rotator cuff, unspecified whether traumatic [M75.121]    Post-Op Diagnosis Codes:     * Complete tear of right rotator cuff, unspecified whether traumatic [M75.121]  Degenerative changes humeral head grade 3 glenoid grade 2, capsulitis impingement  Procedure(s):  RIGHT SHOULDER ARTHROSCOPY, DECOMPRESSION, STEROID INJECTION WITH MINI OPEN ROTATOR CUFF REPAIR    Surgeon(s):  Cynthia Gonzalez MD    Anesthesia: General with Block  Anesthesiologist: Stevie Huerta MD  CRNA: Berny Atkinson CRNA    Staff:   Circulator: Ailin Aguillon RN  Scrub Person: Lori Garnica  Vendor Representative: Yuan Stone; Jorgito Lim  Assistant: Rhona Caldwell    Assistant: Rhona Caldwell  First assist was used throughout the case for proper positioning the patient on the table, positioning of extremity during the procedure assist with the repair and closure    Estimated Blood Loss: 10 cc    Specimens:   * No orders in the log *    Drains: None    Findings: See Dictation    Complications: None    Indication for procedure:   This patient has had a several month history of right shoulder pain that has been unresponsive to conservative management.  They have an exam and an MRI which are consistent with rotator cuff pathology and they present for arthroscopy and possible repair.  They understand all risks, benefits, and alternatives.  Risk including but not exclusive to anesthetic complications including death, MI, CVA as well as infection, bleeding, DVT, PE, stiffness, failure to relieve their symptoms and need for future surgery.  They understand this and agree to proceed.    Description of procedure:  Patient was taken to the operating room.  They were placed supine on the  operating room  table.  After induction of adequate LMA anesthesia and scalene nerve block and IV antibiotics.  They underwent exam under anesthesia was symmetric full range of motion which was symmetric side to side.  They were then placed in the modified beachchair position all prominent areas well padded and head well stabilized.  The arm was prepped and draped  in usual sterile fashion, bony landmarks were demarcated and the joint was infiltrated with 30 mL of fluid.  A standard posterior portal was made inferior and medial to the posterior lateral edge of the acromion with an 11 blade.  Blunt trocar penetrated into the joint, scope followed  and evaluation began.  He was found to have a capsulitis throughout the shoulder despite pretty normal range of motion.  He had a complete biceps tendon tear a tiny subscapularis tear that did not appear to be to be of any significance.  He had degenerative changes on the humeral head most of the anterior aspect felt to be grade 3 a small area of grade 2 centrally on the glenoid rotator cuff appeared pathologic  I then exited the space, entered subacromial space. I made accessory lateral portal off the anterior lateral edge of the acromion, placed the shaver and removed thickened bursa. I delineated the anterior lateral edge of the acromion with the Apollo device and removed a large spur with a bone cutter. There was plenty of room under this joint after this for the rotator cuff. . The rotator cuff was evaluated and was found to have a large full-thickness tear.  I did place a retention stitch with the Scorpion device.  His tear was retracted and I did mobilize it I then exited the space, placed the Kolbel retractors and readily identified the rotator cuff tear. The tuberosity was prepared with a rongeur.  Two Arthrex SwiveLock anchors preloaded with FiberTape x1 were placed in standard fashion with good bony pullout. A FiberTape was preloaded and Scorpion device was used to place each of  the FiberWires x2 one anterior one posterior. I then placed a second anterior stitch anteriorly with the working suture in the swivel lock      stitch anterior and posterior and incorporated this  stitches and 2 Fiber Wires in each of 2 SwiveLock anchors on the lateral row.  The decompression was adequate anteriorly I did use the additional working suture to place 1 additional anterior stitch utilizing the shuttle suture in the lateral row. Everything was thoroughly irrigated. The deltoid is reapproximated with 0 Vicryl, subcutaneous tissue with 2-0 Vicryl.  I then injected the joint with Marcaine and Depo-Medrol from the posterior aspect with a spinal needle the skin was closed with a running 4-0 subcuticular stitch. Sterile dressings and Steri-Strips were applied. The portals were closed with 2-0 Vicryl. Sling was applied. The patient was taken to recovery room in good condition.  All sponge and needle counts were correct.          Date: 4/19/2023  Time: 09:10 VENKATA vallecillo

## 2023-04-19 NOTE — ANESTHESIA POSTPROCEDURE EVALUATION
Patient: Rafita Gary    Procedure Summary     Date: 04/19/23 Room / Location:  [facility] [address] [place] [address] [facility]    Anesthesia Start: 0739 Anesthesia Stop: 0905    Procedure: RIGHT SHOULDER ARTHROSCOPY, DECOMPRESSION, STEROID INJECTION WITH MINI OPEN ROTATOR CUFF REPAIR (Right: Shoulder) Diagnosis:       Complete tear of right rotator cuff, unspecified whether traumatic      (Complete tear of right rotator cuff, unspecified whether traumatic [M75.121])    Surgeons: Cynthia Gonzalez MD Provider: Stevie Huerta MD    Anesthesia Type: general ASA Status: 3          Anesthesia Type: general    Vitals  Vitals Value Taken Time   /72 04/19/23 0945   Temp 36.6 °C (97.9 °F) 04/19/23 0955   Pulse 72 04/19/23 0956   Resp 14 04/19/23 0945   SpO2 95 % 04/19/23 0956   Vitals shown include unvalidated device data.        Post Anesthesia Care and Evaluation    Patient location during evaluation: bedside  Patient participation: complete - patient participated  Level of consciousness: awake and alert  Pain management: adequate    Airway patency: patent  Anesthetic complications: No anesthetic complications  PONV Status: controlled  Cardiovascular status: blood pressure returned to baseline and acceptable  Respiratory status: acceptable  Hydration status: acceptable

## 2023-04-28 NOTE — PROGRESS NOTES
Shoulder Scope RCR follow Up       Patient: Rafita Gary        YOB: 1965      Chief Complaints: shoulder pain right    History of Present Illness: Pt is here f/u shoulder arthroscopy, RCR on the right he is doing well progressing his activities he is really off his pain medicine states he really has no pain at all I told him he was by far the exception not the rule        Allergies:   Allergies   Allergen Reactions   • Doxycycline Rash       Medications:   Home Medications:  Current Outpatient Medications on File Prior to Visit   Medication Sig   • cetirizine (zyrTEC) 10 MG tablet Take 1 tablet by mouth Daily.   • levothyroxine (SYNTHROID, LEVOTHROID) 100 MCG tablet Take 1 tablet by mouth Daily. (Patient taking differently: Take 1 tablet by mouth Every Evening.)   • ondansetron (Zofran) 4 MG tablet Take 1 tablet by mouth Every 8 (Eight) Hours As Needed for Nausea or Vomiting.   • oxyCODONE-acetaminophen (PERCOCET) 5-325 MG per tablet Take 1 tablet by mouth Every 4 (Four) Hours As Needed for Severe Pain.   • pantoprazole (PROTONIX) 40 MG EC tablet Take 1 tablet by mouth Every Night. (Patient taking differently: Take 1 tablet by mouth Daily.)   • rosuvastatin (CRESTOR) 10 MG tablet Take 1 tablet by mouth Every Night.     No current facility-administered medications on file prior to visit.     Current Medications:  Scheduled Meds:  Continuous Infusions:No current facility-administered medications for this visit.    PRN Meds:.          Physical Exam: 58 y.o. male  General Appearance:    Alert, cooperative, in no acute distress                 There were no vitals filed for this visit.   Patient is alert and oriented ×3 no acute distress normal mood physical exam.  Physical exam of the shoulder, incisions looked good there is no erythema,no signs or sx of infection.      Assessment  S/P shoulder scope, rotator cuff repair, he is doing great we remove his sutures placed Steri-Strips I showed him how  to start passively moving his shoulder he can come out of his sling if he sitting he will start physical therapy next week and I will see him back in 4 weeks        Answers for HPI/ROS submitted by the patient on 4/25/2023  What is the primary reason for your visit?: Other  Please describe your symptoms.: Post op appointment from shoulder surgery  Have you had these symptoms before?: No  How long have you been having these symptoms?: 1-2 weeks

## 2023-05-02 ENCOUNTER — OFFICE VISIT (OUTPATIENT)
Dept: ORTHOPEDIC SURGERY | Facility: CLINIC | Age: 58
End: 2023-05-02
Payer: COMMERCIAL

## 2023-05-02 DIAGNOSIS — Z98.890 S/P ROTATOR CUFF REPAIR: Primary | ICD-10-CM

## 2023-05-02 PROCEDURE — 99024 POSTOP FOLLOW-UP VISIT: CPT | Performed by: ORTHOPAEDIC SURGERY

## 2023-05-03 ENCOUNTER — TREATMENT (OUTPATIENT)
Dept: PHYSICAL THERAPY | Facility: CLINIC | Age: 58
End: 2023-05-03
Payer: COMMERCIAL

## 2023-05-03 DIAGNOSIS — Z98.890 STATUS POST RIGHT ROTATOR CUFF REPAIR: Primary | ICD-10-CM

## 2023-05-03 DIAGNOSIS — G89.29 CHRONIC RIGHT SHOULDER PAIN: ICD-10-CM

## 2023-05-03 DIAGNOSIS — M25.511 CHRONIC RIGHT SHOULDER PAIN: ICD-10-CM

## 2023-05-03 NOTE — PROGRESS NOTES
Physical Therapy Initial Evaluation and Plan of Care    Patient: Rafita Gary   : 1965  Diagnosis/ICD-10 Code:  Status post right rotator cuff repair [Z98.890]  Referring practitioner: Cynthia Gonzalez MD  Date of Initial Visit: 5/3/2023  Today's Date: 5/3/2023  Patient seen for 1 sessions  PT Clinic location: [address], [address]   [place]  [address]          Subjective Questionnaire: QuickDASH: 79.55%    Subjective Evaluation    History of Present Illness  Date of surgery: 2023  Mechanism of injury: Pt presents to PT status post right rotator cuff repair.   He notes history of pain for >8 months prior to repair without traumatic incident noted.    Right hand dominant. Lives with support.  Works as maintenance, .    Pain  At worst pain ratin (usually at night, first thing in the AM)    Patient Goals  Patient goals for therapy: return to work, independence with ADLs/IADLs, increased strength, increased motion and decreased pain           Medical history: hypothyroidism, CKD, Raynauds, hypercholesterolemia. See chart for further detail.   Therapy Precautions: Sling for first 6 weeks post-op, no AROM.    Objective          Observations     Right Shoulder  Positive for incision.     Additional Shoulder Observation Details  Sling in situ, well fitted    Passive Range of Motion     Right Shoulder   Flexion: 90 (scaption plane) degrees   External rotation 0°: 0 degrees with pain    Additional Passive Range of Motion Details  Minor restriction R elbow extension with anterior shoulder discomfort          Assessment & Plan     Assessment  Impairments: abnormal muscle firing, abnormal or restricted ROM, activity intolerance, impaired physical strength, lacks appropriate home exercise program and pain with function  Functional Limitations: carrying objects, lifting, sleeping, pulling, pushing, uncomfortable because of pain, moving in bed, reaching behind back,  reaching overhead and unable to perform repetitive tasks  Assessment details: The patient is a 58 y.o. male who presents to physical therapy today following right rotator cuff repair. Upon initial evaluation, the patient demonstrates the following impairments: decreased R shoulder ROM, decreased R shoulder strength, and decreased R elbow extension ROM. Due to these impairments, the patient is unable to perform or has difficulty with the following functional tasks: reaching, lifting, and sleeping. The patient would benefit from skilled PT services to address functional limitations and impairments and to improve patient quality of life.      Prognosis: good    Goals  Plan Goals: ST. Pt will be independent and compliant with initial HEP and surgical precautions in 2 weeks.  2. Pt will report pain level at worst <3 in 4 weeks.   3. Pt will demonstrate an increase in shoulder flexion PROM to 120 degrees within 4 weeks.   4. Pt will demonstrate an increase in shoulder ER PROM to 30 degrees within 4 weeks.     LTG: by DC (12 weeks)  1. Pt will be independent with final HEP for self-management of condition by DC.  2. Pt will improve score on QuickDASH to less than 25% by DC.   3. Pt will demonstrate shoulder AROM flexion to >135 degrees in order to be able to get cup or plate out of cabinet by DC.  4. Pt will demonstrate shoulder flexion and ER strength to 4+/5 in order to be able to perform usual work by DC.        Plan  Therapy options: will be seen for skilled therapy services  Planned modality interventions: cryotherapy, electrical stimulation/Russian stimulation, TENS, thermotherapy (hydrocollator packs) and ultrasound  Planned therapy interventions: ADL retraining, body mechanics training, fine motor coordination training, flexibility, functional ROM exercises, home exercise program, joint mobilization, manual therapy, motor coordination training, neuromuscular re-education, postural training, soft tissue  mobilization, strengthening, stretching and therapeutic activities  Treatment plan discussed with: patient        See flowsheets for treatment detail. Educated on precautions, protocol, HEP, and POC.    History # of Personal Factors and/or Comorbidities: MODERATE (1-2)  Examination of Body System(s): # of elements: LOW (1-2)  Clinical Presentation: STABLE   Clinical Decision Making: LOW       Timed:         Manual Therapy:    8     mins  09663;     Therapeutic Exercise:     8    mins  00673;     Neuromuscular Cachorro:    8    mins  44901;    Therapeutic Activity:          mins  02426;     Gait Training:           mins  30460;     Ultrasound:          mins  11707;    Ionto                                   mins   83137  Self Care                       10     mins   32265      Un-Timed:  Electrical Stimulation:         mins  17591 ( );  Dry Needling          mins self-pay  Traction          mins 50042  Low Eval     15     Mins  93028  Mod Eval          Mins  15107  High Eval                            Mins  21038  Re-Eval                               mins  14171      Timed Treatment:   34   mins   Total Treatment:     49   mins    PT SIGNATURE: Sam Mendez PT   [place] [facility] license #: [id number]  DATE TREATMENT INITIATED: 5/3/2023    Initial Certification  Certification Period: 7/31/2023  I certify that the therapy services are furnished while this patient is under my care.  The services outlined above are required by this patient, and will be reviewed every 90 days.    PHYSICIAN: Cynthia Gonzalez MD  NPI: [id number]                                      DATE:     Please sign and return via fax to [place] - Fax #: [phone]. Thank you, [facility].

## 2023-05-10 ENCOUNTER — TREATMENT (OUTPATIENT)
Dept: PHYSICAL THERAPY | Facility: CLINIC | Age: 58
End: 2023-05-10
Payer: COMMERCIAL

## 2023-05-10 DIAGNOSIS — M25.511 CHRONIC RIGHT SHOULDER PAIN: ICD-10-CM

## 2023-05-10 DIAGNOSIS — Z98.890 STATUS POST RIGHT ROTATOR CUFF REPAIR: Primary | ICD-10-CM

## 2023-05-10 DIAGNOSIS — G89.29 CHRONIC RIGHT SHOULDER PAIN: ICD-10-CM

## 2023-05-10 NOTE — PROGRESS NOTES
Physical Therapy Daily Treatment Note    Patient: Rafita Gary  : 1965  Referring practitioner: Cynthia Gonzalez MD  Today's Date: 5/10/2023    VISIT#: 2      Subjective   Rafita Gary reports: some discomfort during elbow AROM and supine shoulder ER PROM, easing quickly following completion of exercises.      Objective   PROM R shoulder ER 10 deg    See Exercise, Manual, and Modality Logs for complete treatment.     Patient Education: technique, pulleys HEP    Assessment/Plan  Pt tolerated exercises well today with minimal discomfort during ER ROM progressed to 10 degrees from 0 last session. He performed well on pulleys initiated today with mild anterior shoulder soreness, easing somewhat after several repetitions and cuing for further relaxation.    Progress per Plan of Care            Timed:         Manual Therapy:         mins  61280;     Therapeutic Exercise:    15     mins  50526;     Neuromuscular Cachorro:    10    mins  24115;    Therapeutic Activity:          mins  14447;     Gait Training:           mins  20193;     Ultrasound:          mins  85038;    Ionto:                                   mins  48431  Self Care:                            mins  28394    Un-Timed:  Electrical Stimulation:         mins  40939 ( );  Dry Needling          mins self-pay  Traction          mins 74378  Re-Eval                               mins  44877  Group Therapy           ____ mins 99003    Timed Treatment:   25   mins   Total Treatment:     25   mins    Sam Mendez PT  Physical Therapist  [facility] license #: [id number]

## 2023-05-15 ENCOUNTER — TREATMENT (OUTPATIENT)
Dept: PHYSICAL THERAPY | Facility: CLINIC | Age: 58
End: 2023-05-15
Payer: COMMERCIAL

## 2023-05-15 DIAGNOSIS — M25.511 CHRONIC RIGHT SHOULDER PAIN: ICD-10-CM

## 2023-05-15 DIAGNOSIS — M54.12 RADICULOPATHY, CERVICAL: ICD-10-CM

## 2023-05-15 DIAGNOSIS — Z98.890 STATUS POST RIGHT ROTATOR CUFF REPAIR: Primary | ICD-10-CM

## 2023-05-15 DIAGNOSIS — G89.29 CHRONIC RIGHT SHOULDER PAIN: ICD-10-CM

## 2023-05-15 PROCEDURE — 97140 MANUAL THERAPY 1/> REGIONS: CPT | Performed by: PHYSICAL THERAPIST

## 2023-05-15 PROCEDURE — 97110 THERAPEUTIC EXERCISES: CPT | Performed by: PHYSICAL THERAPIST

## 2023-05-15 NOTE — PROGRESS NOTES
Physical Therapy Daily Treatment Note  Visit # 3        Patient: Rafita Gary   : 1965  Referring practitioner: Cynthia Gonzalez MD  Date of Initial Evaluation:  Type: THERAPY  Noted: 5/3/2023  Today's Date: 5/15/2023           ICD-10-CM ICD-9-CM   1. Status post right rotator cuff repair  Z98.890 V45.89   2. Chronic right shoulder pain  M25.511 719.41    G89.29 338.29   3. Radiculopathy, cervical  M54.12 723.4       Subjective  Rafita Gary reports:   No significant changes from status at last PT visit through today.  Has been using pulleys regularly at home.      Objective   See Exercise, Manual, and Modality Logs for complete treatment.   Concluded with CP to (R) shoulder x10m.    Assessment/Plan  Tolerated continued manual therapy and therapeutic exercise per RCR protocol well today, no increased pain reported during or after exercises.       Progress per Plan of Care and Progress strengthening /stabilization /functional activity           Timed:         Manual Therapy:     15    mins  24024     Therapeutic Exercise:     15    mins  21436     Neuromuscular Cachorro:        mins  58889    Therapeutic Activity:          mins  07759     Gait Training:           mins  88299     Ultrasound:          mins  91688    Ionto                                   mins  91357  Self Care                            mins  02497    Un-Timed:  Electrical Stimulation:         mins 02886 ( )  Traction          mins 18583    Timed Treatment:   30   mins   Total Treatment:     30   mins    LINDSAY Mckeon License #[id number]  Physical Therapist Assistant

## 2023-05-19 ENCOUNTER — TREATMENT (OUTPATIENT)
Dept: PHYSICAL THERAPY | Facility: CLINIC | Age: 58
End: 2023-05-19
Payer: COMMERCIAL

## 2023-05-19 DIAGNOSIS — M54.12 RADICULOPATHY, CERVICAL: ICD-10-CM

## 2023-05-19 DIAGNOSIS — Z98.890 STATUS POST RIGHT ROTATOR CUFF REPAIR: Primary | ICD-10-CM

## 2023-05-19 DIAGNOSIS — M25.511 CHRONIC RIGHT SHOULDER PAIN: ICD-10-CM

## 2023-05-19 DIAGNOSIS — G89.29 CHRONIC RIGHT SHOULDER PAIN: ICD-10-CM

## 2023-05-19 NOTE — PROGRESS NOTES
Physical Therapy Daily Treatment Note  Visit # 4        Patient: Rafita Gary   : 1965  Referring practitioner: Cynthia Gonzalez MD  Date of Initial Evaluation:  Type: THERAPY  Noted: 5/3/2023  Today's Date: 2023           ICD-10-CM ICD-9-CM   1. Status post right rotator cuff repair  Z98.890 V45.89   2. Chronic right shoulder pain  M25.511 719.41    G89.29 338.29   3. Radiculopathy, cervical  M54.12 723.4       Subjective  Rafita Gary reports:   Doing well overall, no new c/o.  HEP going well.     Objective   See Exercise, Manual, and Modality Logs for complete treatment.  Concluded with CP to (R) shoulder x10m.    Assessment/Plan  Tolerated continued manual therapy and therapeutic exercise per RCR protocol well today, no increased pain reported during or after exercises.     Progress per Plan of Care and Progress strengthening /stabilization /functional activity           Timed:         Manual Therapy:     15    mins  04319     Therapeutic Exercise:     18    mins  60715     Neuromuscular Cachorro:        mins  43820    Therapeutic Activity:          mins  29688     Gait Training:           mins  22928     Ultrasound:          mins  12442    Ionto                                   mins  57617  Self Care                            mins  78530    Un-Timed:  Electrical Stimulation:         mins 62674 ( )  Traction          mins 57147    Timed Treatment:   33   mins   Total Treatment:     33   mins    LINDSAY Mckeon License #[id number]  Physical Therapist Assistant        [facility]

## 2023-05-22 ENCOUNTER — TREATMENT (OUTPATIENT)
Dept: PHYSICAL THERAPY | Facility: CLINIC | Age: 58
End: 2023-05-22
Payer: COMMERCIAL

## 2023-05-22 DIAGNOSIS — M25.511 CHRONIC RIGHT SHOULDER PAIN: ICD-10-CM

## 2023-05-22 DIAGNOSIS — G89.29 CHRONIC RIGHT SHOULDER PAIN: ICD-10-CM

## 2023-05-22 DIAGNOSIS — Z98.890 STATUS POST RIGHT ROTATOR CUFF REPAIR: Primary | ICD-10-CM

## 2023-05-22 NOTE — PROGRESS NOTES
Physical Therapy Daily Treatment Note  Visit # 5        Patient: Rafita Gary   : 1965  Referring practitioner: Cynthia Gonzalez MD  Date of Initial Evaluation:  Type: THERAPY  Noted: 5/3/2023  Today's Date: 2023           ICD-10-CM ICD-9-CM   1. Status post right rotator cuff repair  Z98.890 V45.89   2. Chronic right shoulder pain  M25.511 719.41    G89.29 338.29       Subjective  Rafita Gary reports:   I had some discomfort when I woke up today, I think my arm had slid around in the sling during the night.      Objective   See Exercise, Manual, and Modality Logs for complete treatment.  Reviewed current HEP, progressed therex program with exercises as noted.  Added self-resisted isometrics at sub-max effort to HEP, written instructions issued.   Concluded with CP to (R) shoulder x10m.    Assessment & Plan     Assessment    Assessment details: Tolerated continued manual therapy and therapeutic exercise per RCR protocol well today, no increased pain reported during or after exercises. STG #1 met.     Goals  Plan Goals:  ST. Pt will be independent and compliant with initial HEP and surgical precautions in 2 weeks - MET  2. Pt will report pain level at worst <3 in 4 weeks.   3. Pt will demonstrate an increase in shoulder flexion PROM to 120 degrees within 4 weeks.   4. Pt will demonstrate an increase in shoulder ER PROM to 30 degrees within 4 weeks.     LTG: by DC (12 weeks)  1. Pt will be independent with final HEP for self-management of condition by DC.  2. Pt will improve score on QuickDASH to less than 25% by DC.   3. Pt will demonstrate shoulder AROM flexion to >135 degrees in order to be able to get cup or plate out of cabinet by DC.  4. Pt will demonstrate shoulder flexion and ER strength to 4+/5 in order to be able to perform usual work by DC.          Progress per Plan of Care and Progress strengthening /stabilization /functional activity           Timed:         Manual  Therapy:     15    mins  26127     Therapeutic Exercise:     20    mins  02048     Neuromuscular Cachorro:        mins  45347    Therapeutic Activity:          mins  67897     Gait Training:           mins  88266     Ultrasound:          mins  73584    Ionto                                   mins  53278  Self Care                            mins  73250    Un-Timed:  Electrical Stimulation:         mins 10847 ( )  Traction          mins 48511    Timed Treatment:   35   mins   Total Treatment:     35   mins    LINDSAY Mckeon License #[id number]  Physical Therapist Assistant

## 2023-05-26 ENCOUNTER — TREATMENT (OUTPATIENT)
Dept: PHYSICAL THERAPY | Facility: CLINIC | Age: 58
End: 2023-05-26
Payer: COMMERCIAL

## 2023-05-26 DIAGNOSIS — M25.511 CHRONIC RIGHT SHOULDER PAIN: ICD-10-CM

## 2023-05-26 DIAGNOSIS — G89.29 CHRONIC RIGHT SHOULDER PAIN: ICD-10-CM

## 2023-05-26 DIAGNOSIS — Z98.890 STATUS POST RIGHT ROTATOR CUFF REPAIR: Primary | ICD-10-CM

## 2023-05-26 NOTE — PROGRESS NOTES
Physical Therapy Daily Treatment Note    Patient: Rafita Gary  : 1965  Referring practitioner: Cynthia Gonzalez MD  Today's Date: 2023    VISIT#: 6      Subjective   Rafita Gary reports: minor discomfort with abduction plane isometrics, previously told to hold off with painful directions. Has follow-up with MD next week for 6 week check.       Objective   ER R PROM ~20 degrees    See Exercise, Manual, and Modality Logs for complete treatment.     Patient Education: focus on external rotation ROM stretching      Assessment/Plan  Pt tolerated exercises with only minor discomfort during end range external rotation. Emphasized focus on this direction of movement during exercise routine at home due to ongoing deficits of mobility.      Progress per Plan of Care          Timed:         Manual Therapy:         mins  28299;     Therapeutic Exercise:    10     mins  14089;     Neuromuscular Cachorro:    10    mins  89440;    Therapeutic Activity:     10     mins  78459;     Gait Training:           mins  04310;     Ultrasound:          mins  32059;    Ionto:                                   mins  09201  Self Care:                            mins  45505    Un-Timed:  Electrical Stimulation:         mins  93821 ( );  Dry Needling          mins self-pay  Traction          mins 55432  Re-Eval                               mins  11593  Group Therapy           ___ mins 03890    Timed Treatment:   30   mins   Total Treatment:     30   mins    Sam Mendez PT  Physical Therapist  [facility] license #: [id number]

## 2023-05-30 ENCOUNTER — TREATMENT (OUTPATIENT)
Dept: PHYSICAL THERAPY | Facility: CLINIC | Age: 58
End: 2023-05-30

## 2023-05-30 ENCOUNTER — OFFICE VISIT (OUTPATIENT)
Dept: ORTHOPEDIC SURGERY | Facility: CLINIC | Age: 58
End: 2023-05-30

## 2023-05-30 DIAGNOSIS — M54.12 RADICULOPATHY, CERVICAL: ICD-10-CM

## 2023-05-30 DIAGNOSIS — M25.511 CHRONIC RIGHT SHOULDER PAIN: ICD-10-CM

## 2023-05-30 DIAGNOSIS — G89.29 CHRONIC RIGHT SHOULDER PAIN: ICD-10-CM

## 2023-05-30 DIAGNOSIS — Z98.890 STATUS POST RIGHT ROTATOR CUFF REPAIR: Primary | ICD-10-CM

## 2023-05-30 DIAGNOSIS — Z98.890 S/P ROTATOR CUFF REPAIR: Primary | ICD-10-CM

## 2023-05-30 NOTE — PROGRESS NOTES
Shoulder Scope RCR follow Up       Patient: Rafita Gary        YOB: 1965      Chief Complaints: shoulder pain right      History of Present Illness: Pt is here f/u shoulder arthroscopy, RCR on the right he is about 6 weeks out doing great progressing his activities really having no pain he is minding his restrictions        Allergies:   Allergies   Allergen Reactions   • Doxycycline Rash       Medications:   Home Medications:  Current Outpatient Medications on File Prior to Visit   Medication Sig   • cetirizine (zyrTEC) 10 MG tablet Take 1 tablet by mouth Daily.   • levothyroxine (SYNTHROID, LEVOTHROID) 100 MCG tablet Take 1 tablet by mouth Daily. (Patient taking differently: Take 1 tablet by mouth Every Evening.)   • ondansetron (Zofran) 4 MG tablet Take 1 tablet by mouth Every 8 (Eight) Hours As Needed for Nausea or Vomiting.   • oxyCODONE-acetaminophen (PERCOCET) 5-325 MG per tablet Take 1 tablet by mouth Every 4 (Four) Hours As Needed for Severe Pain.   • pantoprazole (PROTONIX) 40 MG EC tablet Take 1 tablet by mouth Every Night. (Patient taking differently: Take 1 tablet by mouth Daily.)   • rosuvastatin (CRESTOR) 10 MG tablet Take 1 tablet by mouth Every Night.     No current facility-administered medications on file prior to visit.     Current Medications:  Scheduled Meds:  Continuous Infusions:No current facility-administered medications for this visit.    PRN Meds:.          Physical Exam: 58 y.o. male  General Appearance:    Alert, cooperative, in no acute distress                 There were no vitals filed for this visit.   Patient is alert and oriented ×3 no acute distress normal mood physical exam.  Physical exam of the shoulder, incisions looked good there is no erythema,no signs or sx of infection.  Passively I can get him to about 1 70-1 75 external rotation to 45 rotator cuff strength is 4+/5 with gentle isometric testing    Assessment  S/P shoulder scope, rotator cuff repair,  I think he is doing great he can continue progress his passive range of motion he will start isometrics we reviewed restrictions and precautions he can wean out of the sling I will see him back in 6 to 8 weeks      Answers for HPI/ROS submitted by the patient on 5/23/2023  What is the primary reason for your visit?: Other  Please describe your symptoms.: Follow up for shoulder repair  Have you had these symptoms before?: Yes  How long have you been having these symptoms?: Greater than 2 weeks  Please list any medications you are currently taking for this condition.: None

## 2023-05-30 NOTE — PROGRESS NOTES
Physical Therapy Daily Treatment Note    [facility] [place]  [address], [address]  [place] [address]    Visit # 7        Patient: Rafita Gary   : 1965  Referring practitioner: Cynthia Gonzalez MD  Date of Initial Evaluation:  Type: THERAPY  Noted: 5/3/2023  Today's Date: 2023           ICD-10-CM ICD-9-CM   1. Status post right rotator cuff repair  Z98.890 V45.89   2. Chronic right shoulder pain  M25.511 719.41    G89.29 338.29   3. Radiculopathy, cervical  M54.12 723.4       Subjective  Rafita Gary reports:   No significant changes from status at last PT visit through today.    Objective   See Exercise, Manual, and Modality Logs for complete treatment.   Concluded session with CP x10m    Assessment & Plan     Assessment    Assessment details: Tolerated continued manual therapy and progression of therapeutic exercise well today, no increased pain reported during or after exercises. Making steady progress toward all STG.     Goals  Plan Goals:  ST. Pt will be independent and compliant with initial HEP and surgical precautions in 2 weeks - MET  2. Pt will report pain level at worst <3 in 4 weeks.   3. Pt will demonstrate an increase in shoulder flexion PROM to 120 degrees within 4 weeks.   4. Pt will demonstrate an increase in shoulder ER PROM to 30 degrees within 4 weeks.     LTG: by DC (12 weeks)  1. Pt will be independent with final HEP for self-management of condition by DC.  2. Pt will improve score on QuickDASH to less than 25% by DC.   3. Pt will demonstrate shoulder AROM flexion to >135 degrees in order to be able to get cup or plate out of cabinet by DC.  4. Pt will demonstrate shoulder flexion and ER strength to 4+/5 in order to be able to perform usual work by DC.        Progress per Plan of Care, follow up with MD later today.            Timed:         Manual Therapy:     12    mins  32566     Therapeutic Exercise:     15    mins   31147     Neuromuscular Cachorro:    10    mins  66977    Therapeutic Activity:          mins  38212     Gait Training:           mins  61088     Ultrasound:          mins  84632    Ionto                                   mins  26124  Self Care                            mins  16604    Un-Timed:  Electrical Stimulation:         mins 19621 ( )  Traction          mins 46119    Timed Treatment:   37   mins   Total Treatment:     37   mins    LINDSAY Mckeon License #[id number]  Physical Therapist Assistant

## 2023-06-02 ENCOUNTER — TREATMENT (OUTPATIENT)
Dept: PHYSICAL THERAPY | Facility: CLINIC | Age: 58
End: 2023-06-02

## 2023-06-02 DIAGNOSIS — Z98.890 STATUS POST RIGHT ROTATOR CUFF REPAIR: Primary | ICD-10-CM

## 2023-06-02 DIAGNOSIS — M25.511 CHRONIC RIGHT SHOULDER PAIN: ICD-10-CM

## 2023-06-02 DIAGNOSIS — G89.29 CHRONIC RIGHT SHOULDER PAIN: ICD-10-CM

## 2023-06-02 NOTE — LETTER
Physical Therapy Progress Note    Patient: Rafita Gary  : 1965  Referring practitioner: Cynthia Gonzalez MD  Today's Date: 2023    VISIT#: 8    QDASH: 65.91%    Subjective Evaluation    Pain  At worst pain ratin    Rafita Gary reports: some mild soreness with exercises, has been compliant with HEP.        Objective      Passive Range of Motion     Right Shoulder   Flexion: 120 degrees   External rotation 0°: 30 degrees     Additional Passive Range of Motion Details  Minor restriction R elbow extension with anterior shoulder discomfort    Strength/Myotome Testing     Additional Strength Details  Not tested due to post-op status        Assessment & Plan     Assessment    Assessment details: Pt has made good overall progress during his POC to date. He has shown improvements in PROM of R shoulder, with some minor ongoing restriction at elbow due to biceps involvement in surgery. He has been compliant with his post-operative restrictions and HEP.   He has met all STGs for therapy and is progressing as expected.  He will continue to benefit from skilled PT services to address his ongoing deficits and guide his rehabilitation to return him to his PLOF.    Goals  Plan Goals: ST. Pt will be independent and compliant with initial HEP and surgical precautions in 2 weeks. MET  2. Pt will report pain level at worst <3 in 4 weeks. MET  3. Pt will demonstrate an increase in shoulder flexion PROM to 120 degrees within 4 weeks. MET  4. Pt will demonstrate an increase in shoulder ER PROM to 30 degrees within 4 weeks. MET    LTG: by DC (12 weeks)  1. Pt will be independent with final HEP for self-management of condition by DC.  2. Pt will improve score on QuickDASH to less than 25% by DC.   3. Pt will demonstrate shoulder AROM flexion to >135 degrees in order to be able to get cup or plate out of cabinet by DC.  4. Pt will demonstrate shoulder flexion and ER strength to 4+/5 in order to be able to  perform usual work by PATTY.      Progress per Plan of Care     Sam Mendez, PT  Physical Therapist  [place] [facility] license #: [id number]

## 2023-06-02 NOTE — PROGRESS NOTES
Physical Therapy Daily Treatment and Progress Note    Patient: Rafita Gary  : 1965  Referring practitioner: Cynthia Gonzalez MD  Today's Date: 2023    VISIT#: 8    QDASH: 65.91%    Subjective Evaluation    Pain  At worst pain ratin      Rafita Gary reports: some mild soreness with exercises, has been compliant with HEP.        Objective          Passive Range of Motion     Right Shoulder   Flexion: 120 degrees   External rotation 0°: 30 degrees     Additional Passive Range of Motion Details  Minor restriction R elbow extension with anterior shoulder discomfort    Strength/Myotome Testing     Additional Strength Details  Not tested due to post-op status        See Exercise, Manual, and Modality Logs for complete treatment.     Patient Education: assessment findings and progress; 6 week AAROM phase      Assessment & Plan     Assessment    Assessment details: Pt has made good overall progress during his POC to date. He has shown improvements in PROM of R shoulder, with some minor ongoing restriction at elbow due to biceps involvement in surgery. He has been compliant with his post-operative restrictions and HEP.   He has met all STGs for therapy and is progressing as expected.  He will continue to benefit from skilled PT services to address his ongoing deficits and guide his rehabilitation to return him to his PLOF.    Goals  Plan Goals: ST. Pt will be independent and compliant with initial HEP and surgical precautions in 2 weeks. MET  2. Pt will report pain level at worst <3 in 4 weeks. MET  3. Pt will demonstrate an increase in shoulder flexion PROM to 120 degrees within 4 weeks. MET  4. Pt will demonstrate an increase in shoulder ER PROM to 30 degrees within 4 weeks. MET    LTG: by DC (12 weeks)  1. Pt will be independent with final HEP for self-management of condition by DC.  2. Pt will improve score on QuickDASH to less than 25% by DC.   3. Pt will demonstrate shoulder AROM  flexion to >135 degrees in order to be able to get cup or plate out of cabinet by DC.  4. Pt will demonstrate shoulder flexion and ER strength to 4+/5 in order to be able to perform usual work by DC.        Progress per Plan of Care          Timed:         Manual Therapy:         mins  15483;     Therapeutic Exercise:    10     mins  98240;     Neuromuscular Cachorro:        mins  65753;    Therapeutic Activity:     15     mins  89372;     Gait Training:           mins  30125;     Ultrasound:          mins  34474;    Ionto:                                   mins  02454  Self Care:                       5     mins  97132    Un-Timed:  Electrical Stimulation:         mins  99332 ( );  Dry Needling          mins self-pay  Traction          mins 71837  Re-Eval                               mins  79299  Group Therapy           ___ mins 77596    Timed Treatment:   30   mins   Total Treatment:     30   mins    Sam Mendez PT  Physical Therapist  [facility] license #: [id number]

## 2023-06-05 ENCOUNTER — TREATMENT (OUTPATIENT)
Dept: PHYSICAL THERAPY | Facility: CLINIC | Age: 58
End: 2023-06-05
Payer: COMMERCIAL

## 2023-06-05 DIAGNOSIS — Z98.890 STATUS POST RIGHT ROTATOR CUFF REPAIR: Primary | ICD-10-CM

## 2023-06-05 DIAGNOSIS — G89.29 CHRONIC RIGHT SHOULDER PAIN: ICD-10-CM

## 2023-06-05 DIAGNOSIS — M25.511 CHRONIC RIGHT SHOULDER PAIN: ICD-10-CM

## 2023-06-05 NOTE — PROGRESS NOTES
Physical Therapy Daily Treatment Note    Patient: Rafita Gary  : 1965  Referring practitioner: Cynthia Gonzalez MD  Today's Date: 2023    VISIT#: 9      Subjective   Rafita Gary reports: still having some soreness with external rotation stretch at home, but has been trying to focus on this motion.      Objective     See Exercise, Manual, and Modality Logs for complete treatment.     Patient Education: progression of AAROM tasks with alphabets; cuing for improved wall slide technique      Assessment/Plan  Pt tolerated exercises well today with some ongoing soreness during external rotation stretching. He was provided cuing for improved technique with wall slides with reports of decreased effort and improved stretch.       Progress per Plan of Care          Timed:         Manual Therapy:         mins  30534;     Therapeutic Exercise:    10     mins  12107;     Neuromuscular Cachorro:    8    mins  99923;    Therapeutic Activity:     12     mins  49933;     Gait Training:           mins  61069;     Ultrasound:          mins  93949;    Ionto:                                   mins  62340  Self Care:                            mins  68157    Un-Timed:  Electrical Stimulation:         mins  38067 ( );  Dry Needling          mins self-pay  Traction          mins 60234  Re-Eval                               mins  24225  Group Therapy           ___ mins 39708    Timed Treatment:   30   mins   Total Treatment:     30   mins    Sam Mendez PT  Physical Therapist  [facility] license #: [id number]

## 2023-06-12 ENCOUNTER — TREATMENT (OUTPATIENT)
Dept: PHYSICAL THERAPY | Facility: CLINIC | Age: 58
End: 2023-06-12
Payer: COMMERCIAL

## 2023-06-12 DIAGNOSIS — M25.511 CHRONIC RIGHT SHOULDER PAIN: ICD-10-CM

## 2023-06-12 DIAGNOSIS — G89.29 CHRONIC RIGHT SHOULDER PAIN: ICD-10-CM

## 2023-06-12 DIAGNOSIS — Z98.890 STATUS POST RIGHT ROTATOR CUFF REPAIR: Primary | ICD-10-CM

## 2023-06-12 NOTE — PROGRESS NOTES
Physical Therapy Daily Treatment Note    Patient: Rafita Gary  : 1965  Referring practitioner: Cynthia Gonzalez MD  Today's Date: 2023    VISIT#: 10      Subjective   Rafita Gary reports: no new complaints      Objective   TOP R infraspinatus, teres minor muscle bellies    See Exercise, Manual, and Modality Logs for complete treatment.     Patient Education: self-STM; update HEP      Assessment/Plan  Pt notes some anterolateral shoulder discomfort during wall slides and with PROM and AROM shoulder ER today. Educated and prescribed self-STM of infraspinatus and teres minor to improve referral based discomfort due to muscle belly tightness/irritation. He noted less discomfort following this intervention.      Progress per Plan of Care          Timed:         Manual Therapy:    5     mins  73028;     Therapeutic Exercise:    10     mins  85345;     Neuromuscular Cachorro:    8    mins  31741;    Therapeutic Activity:     15     mins  57247;     Gait Training:           mins  52655;     Ultrasound:          mins  64256;    Ionto:                                   mins  93873  Self Care:                            mins  21136    Un-Timed:  Electrical Stimulation:         mins  66020 ( );  Dry Needling          mins self-pay  Traction          mins 98093  Re-Eval                               mins  27232  Group Therapy           ___ mins 76341    Timed Treatment:   38   mins   Total Treatment:     38   mins    Sam Mendez PT  Physical Therapist  [facility] license #: [id number]

## 2023-06-14 ENCOUNTER — TELEPHONE (OUTPATIENT)
Dept: ORTHOPEDIC SURGERY | Facility: CLINIC | Age: 58
End: 2023-06-14

## 2023-06-14 NOTE — TELEPHONE ENCOUNTER
PLEASE EXTEND PATIENTS SHORT TERM DISABILITY.  HE COMES BACK IN 7/11/23 AND CURRENTLY EXTENDED OUT TIL 7/10/23

## 2023-06-16 ENCOUNTER — TREATMENT (OUTPATIENT)
Dept: PHYSICAL THERAPY | Facility: CLINIC | Age: 58
End: 2023-06-16
Payer: COMMERCIAL

## 2023-06-16 DIAGNOSIS — M25.511 CHRONIC RIGHT SHOULDER PAIN: ICD-10-CM

## 2023-06-16 DIAGNOSIS — Z98.890 STATUS POST RIGHT ROTATOR CUFF REPAIR: Primary | ICD-10-CM

## 2023-06-16 DIAGNOSIS — G89.29 CHRONIC RIGHT SHOULDER PAIN: ICD-10-CM

## 2023-06-16 NOTE — PROGRESS NOTES
Physical Therapy Daily Treatment Note    Patient: Rafita Gary  : 1965  Referring practitioner: Cynthia Gonzalez MD  Diagnosis/ICD-10 Code:  Status post right rotator cuff repair [Z98.890]  Today's Date: 2023    VISIT#: 11      Subjective   Rafita Gary reports: no new complaints      Objective       See Exercise, Manual, and Modality Logs for complete treatment.     Patient Education: HEP update      Assessment/Plan  Pt tolerated progressions well today to include 1 lb weight with sidelying ER and 2 lbs with AAROM flexion tasks. He noted anterior shoulder tightness during ER task, therefore pec stretch added with good effect and no reports of pain. CKC shoulder taps at wall added with good tolerance.      Progress per Plan of Care          Timed:         Manual Therapy:         mins  83222;     Therapeutic Exercise:    6     mins  66779;     Neuromuscular Cachorro:    10    mins  54212;    Therapeutic Activity:     14     mins  50696;     Gait Training:           mins  77716;     Ultrasound:          mins  26245;    Ionto:                                   mins  10245  Self Care:                            mins  82372    Un-Timed:  Electrical Stimulation:         mins  76861 ( );  Dry Needling          mins self-pay  Traction          mins 54901  Re-Eval                               mins  19383  Group Therapy           ___ mins 02831    Timed Treatment:   30   mins   Total Treatment:     30   mins    Sam Mendez PT  Physical Therapist  [facility] license #: [id number]

## 2023-08-21 ENCOUNTER — OFFICE VISIT (OUTPATIENT)
Dept: ORTHOPEDIC SURGERY | Facility: CLINIC | Age: 58
End: 2023-08-21
Payer: COMMERCIAL

## 2023-08-21 VITALS — HEIGHT: 74 IN | WEIGHT: 212 LBS | TEMPERATURE: 98.2 F | BODY MASS INDEX: 27.21 KG/M2

## 2023-08-21 DIAGNOSIS — Z98.890 S/P ROTATOR CUFF REPAIR: Primary | ICD-10-CM

## 2023-08-21 NOTE — PROGRESS NOTES
"Shoulder Scope RCR follow Up       Patient: Rafita Gary        YOB: 1965      Chief Complaints: shoulder pain right      History of Present Illness: Pt is here f/u shoulder arthroscopy, RCR on the right he is 4 months out now doing well still little limited on internal rotation and external rotation but progressing well he states the only thing he can do at this time is scratch his back and also get back to work.  He is a  and will require a lot more strength and endurance before getting back to that        Allergies:   Allergies   Allergen Reactions    Doxycycline Rash       Medications:   Home Medications:  Current Outpatient Medications on File Prior to Visit   Medication Sig    cetirizine (zyrTEC) 10 MG tablet Take 1 tablet by mouth Daily.    levothyroxine (SYNTHROID, LEVOTHROID) 100 MCG tablet Take 1 tablet by mouth Every Evening for 270 days.    pantoprazole (PROTONIX) 40 MG EC tablet Take 1 tablet by mouth Every Morning Before Breakfast for 270 days.    rosuvastatin (CRESTOR) 10 MG tablet Take 1 tablet by mouth Every Night.     No current facility-administered medications on file prior to visit.     Current Medications:  Scheduled Meds:  Continuous Infusions:No current facility-administered medications for this visit.    PRN Meds:.          Physical Exam: 58 y.o. male  General Appearance:    Alert, cooperative, in no acute distress                   Vitals:    08/21/23 1107   Temp: 98.2 øF (36.8 øC)   Weight: 96.2 kg (212 lb)   Height: 188 cm (74\")   PainSc:   3      Patient is alert and oriented x3 no acute distress normal mood physical exam.  Physical exam of the shoulder, incisions looked good there is no erythema,no signs or sx of infection.  Physical exam of the right shoulder reveals no overlying skin changes no lymphedema no lymphadenopathy.  Patient has active flexion 180 with mild symptoms abduction is similar external rotation is to 50 and internal rotation to the " lower lumbar spine with mild symptoms.  Patient has good rotator cuff strength 4+ over 5 with isometric strength testing with pain.  Patient has a positive impingement and a positive Colon sign.  Patient has good cervical range of motion which is full and asymptomatic no radicular symptoms.  Patient has a normal elbow exam.  Good distal pulses are present  Patient has pain with overhead activity and a positive Neer sign and a positive empty can sign , a positive drop arm and a definitive painful arc       Assessment  S/P shoulder scope, rotator cuff repair, he is 4 months out now I think doing really well but I would like him to continue working on his strength that last little bit of range of motion and ultimately endurance I will see him back in 6 weeks I will keep him off work.  From what he does at the earliest he could potentially go back would be 6 months

## 2023-08-30 ENCOUNTER — TREATMENT (OUTPATIENT)
Dept: PHYSICAL THERAPY | Facility: CLINIC | Age: 58
End: 2023-08-30
Payer: COMMERCIAL

## 2023-08-30 DIAGNOSIS — M25.511 CHRONIC RIGHT SHOULDER PAIN: ICD-10-CM

## 2023-08-30 DIAGNOSIS — G89.29 CHRONIC RIGHT SHOULDER PAIN: ICD-10-CM

## 2023-08-30 DIAGNOSIS — Z98.890 STATUS POST RIGHT ROTATOR CUFF REPAIR: Primary | ICD-10-CM

## 2023-08-30 NOTE — PROGRESS NOTES
Re-Certification/Plan of Care    [facility] [place]  [address]  [place] [address]     Patient: Rafita Gary   : 1965  Referring practitioner: Cynthia Gonzalez MD  Date of Initial Visit: Type: THERAPY  Noted: 5/3/2023  Today's Date: 2023  Patient seen for 17 sessions      Visit Diagnoses:    ICD-10-CM ICD-9-CM   1. Status post right rotator cuff repair  Z98.890 V45.89   2. Chronic right shoulder pain  M25.511 719.41    G89.29 338.29       Subjective Questionnaire: QuickDASH: 22.5%    Subjective:   Subjective Evaluation    Pain  At worst pain ratin    Rafita Gary reports: that he is still a little limited in reaching overhead, behind head, behind back, cross body, as well as maximal shoulder strength particularly in end range positions. Job requirements state 50 lb weight lift requirements, which he notes are still challenging in positions away from midline or in overhead positions.  Tried throwing last week but is unable to perform overhand throws.  Currently off work until Oct 9th 2023 per MD until physical lifting requirements are met.    Clinical Progress: improved  Home Program Compliance: Yes      Objective          Active Range of Motion   Left Shoulder   External rotation 0ø: 50 degrees   External rotation BTH: T2   Internal rotation BTB: T3     Right Shoulder   Flexion: 159 degrees   Abduction: 149 degrees   External rotation 0ø: 42 degrees   External rotation 90ø: 75 degrees  External rotation BTH: C7   Internal rotation BTB: L5     Strength/Myotome Testing     Left Shoulder     Planes of Motion   Flexion: 5   Abduction: 4+   External rotation at 0ø: 4+   External rotation at 90ø: 4+     Isolated Muscles   Biceps: 5     Right Shoulder     Planes of Motion   Flexion: 4+   Abduction: 4+   External rotation at 0ø: 5   External rotation at 90ø: 5     Isolated Muscles   Biceps: 5           Assessment & Plan        Assessment  Assessment details: Pt has made good overall progress during his POC to date. He has shown further improvements in AROM of R shoulder. His strength upon testing is good with further progress since last assessment. He has been compliant with his post-operative restrictions and HEP. He still notes mild end range motion restrictions and difficulty with maximal lifting as required by his place of work (up to 50 lbs overhead).  He has met all STGs and 3/4 LTGs for therapy and is progressing as expected.  He will continue to benefit from skilled PT services to address his ongoing deficits and guide his rehabilitation to return him to his PLOF.    Goals  Plan Goals: ST. Pt will be independent and compliant with initial HEP and surgical precautions in 2 weeks. MET  2. Pt will report pain level at worst <3 in 4 weeks. MET  3. Pt will demonstrate an increase in shoulder flexion PROM to 120 degrees within 4 weeks. MET  4. Pt will demonstrate an increase in shoulder ER PROM to 30 degrees within 4 weeks. MET    LTG: by DC (12 weeks)  1. Pt will be independent with final HEP for self-management of condition by DC.  2. Pt will improve score on QuickDASH to less than 25% by DC. MET  3. Pt will demonstrate shoulder AROM flexion to >135 degrees in order to be able to get cup or plate out of cabinet by DC. MET  4. Pt will demonstrate shoulder flexion and ER strength to 4+/5 in order to be able to perform usual work by DC. MET      Plan  Frequency: 2x month  Duration in weeks: 6  Treatment plan discussed with: patient        Progress toward previous goals: Partially Met        Recommendations: Continue as planned  Certification Period: 2023  Prognosis to achieve goals: good    PT Signature: Sam Mendez, PT  [place] [facility] license #: [id number]    Based upon review of the patient's progress and continued therapy plan, it is my medical opinion that Rafita Gary should continue physical therapy treatment at  [facility]  [address]  [facility] [address] [phone].    Signature: __________________________________  Cynthia Gonzalez MD  Please sign and return via fax to [place] - Fax #: [phone]. Thank you, [facility].    Timed:         Manual Therapy:         mins  61586;     Therapeutic Exercise:    8     mins  69382;     Neuromuscular Cachorro:        mins  80772;    Therapeutic Activity:     18     mins  99012;     Gait Training:           mins  11436;     Ultrasound:          mins  07934;    Ionto                                   mins   60255  Self Care                       12     mins   61552      Un-Timed:  Electrical Stimulation:         mins  94413 ( );  Dry Needling          mins self-pay  Traction          mins 56576  Low Eval          Mins  95671  Mod Eval          Mins  23300  High Eval                            Mins  07880  Re-Eval                               mins  53701  Group Therapy           ____ mins 99188      Timed Treatment:   38   mins   Total Treatment:     60   mins

## 2023-09-11 ENCOUNTER — TREATMENT (OUTPATIENT)
Dept: PHYSICAL THERAPY | Facility: CLINIC | Age: 58
End: 2023-09-11
Payer: COMMERCIAL

## 2023-09-11 DIAGNOSIS — G89.29 CHRONIC RIGHT SHOULDER PAIN: ICD-10-CM

## 2023-09-11 DIAGNOSIS — M25.511 CHRONIC RIGHT SHOULDER PAIN: ICD-10-CM

## 2023-09-11 DIAGNOSIS — Z98.890 STATUS POST RIGHT ROTATOR CUFF REPAIR: Primary | ICD-10-CM

## 2023-09-11 NOTE — PROGRESS NOTES
Physical Therapy Daily Treatment Note  [facility] [place]  [address]  [place] [address]     Patient: Rafita Gary  : 1965  Referring practitioner: Cynthia Gonzalez MD  Today's Date: 2023    VISIT#: 18    Visit Diagnoses:    ICD-10-CM ICD-9-CM   1. Status post right rotator cuff repair  Z98.890 V45.89   2. Chronic right shoulder pain  M25.511 719.41    G89.29 338.29       Subjective   Rafita Gary reports: continued compliance with HEP. He notes also doing some weightlifting at home.       Objective       See Exercise, Manual, and Modality Logs for complete treatment.     Patient Education: HEP update; encouraged strengthening      Assessment/Plan  Pt tolerated exercises well with some difficulty noted during thread the needle added today. Progressed end range awkward movements with light resistance while encouraging basic strengthening exercises at home with heavier loads such as chest press, biceps curls etc.      Progress per Plan of Care          Timed:         Manual Therapy:         mins  67025;     Therapeutic Exercise:    4     mins  81651;     Neuromuscular Cachorro:    10    mins  09366;    Therapeutic Activity:     16     mins  21225;     Gait Training:           mins  05758;     Ultrasound:          mins  27393;    Ionto:                                   mins  57679  Self Care:                            mins  78499    Un-Timed:  Electrical Stimulation:         mins  99803 ( );  Dry Needling          mins self-pay  Traction          mins 34290  Re-Eval                               mins  46579  Group Therapy           ___ mins 33045    Timed Treatment:   30   mins   Total Treatment:     30   mins    Sam Mendez PT  Physical Therapist  [facility] license #: [id number]

## 2023-09-25 ENCOUNTER — TREATMENT (OUTPATIENT)
Dept: PHYSICAL THERAPY | Facility: CLINIC | Age: 58
End: 2023-09-25

## 2023-09-25 DIAGNOSIS — Z98.890 STATUS POST RIGHT ROTATOR CUFF REPAIR: Primary | ICD-10-CM

## 2023-09-25 DIAGNOSIS — G89.29 CHRONIC RIGHT SHOULDER PAIN: ICD-10-CM

## 2023-09-25 DIAGNOSIS — M25.511 CHRONIC RIGHT SHOULDER PAIN: ICD-10-CM

## 2023-09-25 NOTE — PROGRESS NOTES
Physical Therapy Daily Treatment Note  [facility] [place]  [address]  [place] [address]     Patient: Rafita Gary  : 1965  Referring practitioner: Cynthia Gonzalez MD  Today's Date: 2023    VISIT#: 19    Visit Diagnoses:    ICD-10-CM ICD-9-CM   1. Status post right rotator cuff repair  Z98.890 V45.89   2. Chronic right shoulder pain  M25.511 719.41    G89.29 338.29       Subjective   Rafita Gary reports: compliant with exercises but having difficulty with thread the needle. Has continued strengthening with weights at home gym without issues.      Objective   Decreased R scapular retraction and thoracic rotation evident during wall angels and thread the needles.      See Exercise, Manual, and Modality Logs for complete treatment.     Patient Education: HEP update and plan for POC following this visit - PRN attendance       Assessment/Plan  Pt demonstrates ongoing difficulty with R combined external and combined internal rotation movements (HBH and HBB). Modified exercise plan to include further mobility exercises to improve end range motion of scapulohumeral complex. Discussed ongoing strengthening at home and wall angels to assess symmetry of movement.      Progress per Plan of Care          Timed:         Manual Therapy:         mins  51300;     Therapeutic Exercise:    12     mins  38762;     Neuromuscular Cachorro:        mins  03397;    Therapeutic Activity:     10     mins  28281;     Gait Training:           mins  49844;     Ultrasound:          mins  06166;    Ionto:                                   mins  40087  Self Care:                       8     mins  98250    Un-Timed:  Electrical Stimulation:         mins  43008 (MC );  Dry Needling          mins self-pay  Traction          mins 04144  Re-Eval                               mins  75393  Group Therapy           ___ mins 33496    Timed Treatment:   30   mins   Total  Treatment:     30   mins    Sam Mendez PT  Physical Therapist  [facility] license #: [id number]

## 2023-10-06 NOTE — PROGRESS NOTES
Patient: Rafita Gary  YOB: 1965  Date of Service: 10/6/2023    Chief Complaints: Right shoulder pain    Subjective:    History of Present Illness: Pt is seen in the office today with complaints of right shoulder pain he is status post rotator cuff repair he is 6 months out doing well he states he is happy with where he is he is anxious to return to work        Allergies:   Allergies   Allergen Reactions    Doxycycline Rash       Medications:   Home Medications:  Current Outpatient Medications on File Prior to Visit   Medication Sig    cetirizine (zyrTEC) 10 MG tablet Take 1 tablet by mouth Daily.    levothyroxine (SYNTHROID, LEVOTHROID) 100 MCG tablet Take 1 tablet by mouth Every Evening for 270 days.    pantoprazole (PROTONIX) 40 MG EC tablet Take 1 tablet by mouth Every Morning Before Breakfast for 270 days.    rosuvastatin (CRESTOR) 10 MG tablet Take 1 tablet by mouth Every Night.     No current facility-administered medications on file prior to visit.     Current Medications:  Scheduled Meds:  Continuous Infusions:No current facility-administered medications for this visit.    PRN Meds:.    I have reviewed the patient's medical history in detail and updated the computerized patient record.  Review and summarization of old records include:    Past Medical History:   Diagnosis Date    Allergic     Ankle sprain     Kid    Arthritis 4/18/2023    Removed at surgery    Chronic kidney disease     STAGE 2    Colonic polyp     Dislocation, shoulder     Same time as humerus break    Diverticulosis     Foreign body (FB) in soft tissue 06/11/2015    tick head in right lower back    Fracture, fibula 01/12/1989    Fracture, finger 06/23/1976    Smashed middle finger broke the tip (2 times)    Fracture, humerus     Not sure, I was maybe 10    Fracture, tibia and fibula 01/12/1989    Compound fracture both bones lower right leg    GERD (gastroesophageal reflux disease)     History of pneumothorax      Hyperlipidemia     Hypothyroidism     Lumbosacral disc disease     Couple of times lower back bulging disc( PT)    Lyme disease     Other specified abnormal findings of blood chemistry 2019    PONV (postoperative nausea and vomiting)     Renal insufficiency 2018    Right shoulder pain     LIMITED ROM    Rotator cuff syndrome 2022    Screening for prostate cancer     digital prostate exam    Sleep apnea     mild, NO CPAP    Slow to wake up after anesthesia     AFTER FRACTURE SURGERY    Tick bite     Total bilirubin, elevated 2015        Past Surgical History:   Procedure Laterality Date    COLONOSCOPY      ENDOSCOPY  2012    Dr. Aldana; gastritis and esophagitis    FRACTURE SURGERY  1989    HAND SURGERY      First time I smashed middle finger    POLYSOMNOGRAPHY  2011    mild obst sleep apnea    SHOULDER ARTHROSCOPY W/ ROTATOR CUFF REPAIR Right 2023    Procedure: RIGHT SHOULDER ARTHROSCOPY, DECOMPRESSION, STEROID INJECTION WITH MINI OPEN ROTATOR CUFF REPAIR;  Surgeon: Cynthia Gonzalez MD;  Location[place] [facility] [place] [facility] [place]  Service: Orthopedics;  Laterality: Right;    SHOULDER SURGERY  2023        Social History     Occupational History    Not on file   Tobacco Use    Smoking status: Former     Packs/day: 0.50     Years: 15.00     Pack years: 7.50     Types: Cigarettes     Start date: 1973     Quit date: 1988     Years since quittin.6    Smokeless tobacco: Never   Vaping Use    Vaping Use: Never used   Substance and Sexual Activity    Alcohol use: Never    Drug use: Never    Sexual activity: Not Currently     Partners: Female     Birth control/protection: None      Social History     Social History Narrative    Not on file        Family History   Problem Relation Age of Onset    Thyroid disease Mother     Alcohol abuse Father     Cancer Father         colon    Alcohol abuse Brother     Alcohol abuse Brother     Malig Hyperthermia Neg Hx        ROS: 14 point  review of systems was performed and was negative except for documented findings in HPI and today's encounter.     Allergies:   Allergies   Allergen Reactions    Doxycycline Rash     Constitutional:  Denies fever, shaking or chills   Eyes:  Denies change in visual acuity   HENT:  Denies nasal congestion or sore throat   Respiratory:  Denies cough or shortness of breath   Cardiovascular:  Denies chest pain or severe LE edema   GI:  Denies abdominal pain, nausea, vomiting, bloody stools or diarrhea   Musculoskeletal:  Numbness, tingling, or loss of motor function only as noted above in history of present illness.  : Denies painful urination or hematuria  Integument:  Denies rash, lesion or ulceration   Neurologic:  Denies headache or focal weakness  Endocrine:  Denies lymphadenopathy  Psych:  Denies confusion or change in mental status   Hem:  Denies active bleeding      Physical Exam: 58 y.o. male  Wt Readings from Last 3 Encounters:   08/21/23 96.2 kg (212 lb)   07/11/23 99.9 kg (220 lb 4.8 oz)   07/06/23 101 kg (223 lb)       There is no height or weight on file to calculate BMI.    There were no vitals filed for this visit.  Vital signs reviewed.   General Appearance:    Alert, cooperative, in no acute distress                    Ortho exam  Physical exam of the l right t shoulder reveals no overlying skin changes no lymphedema no lymphadenopathy.  The patient can actively flex to 180, abduction is similar external rotation is to 50, internal rotation to the upper lumbar spine.  Rotator cuff strength is 4+ to 5 over 5 with isometric strength testing without symptoms.  The patient has good cervical range of motion full and asymptomatic no radicular symptoms and a normal elbow exam.  Patient has good distal pulses                Assessment: Status post right rotator cuff repair I think he is doing well clinically subjectively he can continue to progress his activities he will continue to work on strength and  endurance he can return to work on the 11th without restrictions and you as long as he is doing well he will follow-up as needed    Plan: Plan is as above  Follow up as indicated.  Ice, elevate, and rest as needed.  Discussed conservative measures of pain control including ice, bracing.   Cynthia Gonzalez M.D.

## 2023-10-09 ENCOUNTER — OFFICE VISIT (OUTPATIENT)
Dept: ORTHOPEDIC SURGERY | Facility: CLINIC | Age: 58
End: 2023-10-09
Payer: COMMERCIAL

## 2023-10-09 VITALS — TEMPERATURE: 97.8 F | BODY MASS INDEX: 26.5 KG/M2 | HEIGHT: 74 IN | WEIGHT: 206.5 LBS

## 2023-10-09 DIAGNOSIS — Z98.890 S/P ROTATOR CUFF REPAIR: Primary | ICD-10-CM

## 2023-10-09 PROCEDURE — 99212 OFFICE O/P EST SF 10 MIN: CPT | Performed by: ORTHOPAEDIC SURGERY

## 2023-10-09 NOTE — LETTER
October 9, 2023     Patient: aRfita Gary   YOB: 1965   Date of Visit: 10/9/2023       To Whom It May Concern:    It is my medical opinion that Rafita Gary  .  Can return to work without restrictions on 10/11/2023           Sincerely,        Cynthia Gonzalez MD    CC:   No Recipients

## 2024-01-31 ENCOUNTER — OFFICE VISIT (OUTPATIENT)
Dept: FAMILY MEDICINE CLINIC | Facility: CLINIC | Age: 59
End: 2024-01-31
Payer: COMMERCIAL

## 2024-01-31 VITALS
HEART RATE: 88 BPM | WEIGHT: 196 LBS | DIASTOLIC BLOOD PRESSURE: 70 MMHG | RESPIRATION RATE: 16 BRPM | BODY MASS INDEX: 25.15 KG/M2 | OXYGEN SATURATION: 99 % | HEIGHT: 74 IN | SYSTOLIC BLOOD PRESSURE: 124 MMHG

## 2024-01-31 DIAGNOSIS — Z00.00 ENCOUNTER FOR WELLNESS EXAMINATION IN ADULT: Primary | ICD-10-CM

## 2024-01-31 DIAGNOSIS — N18.31 STAGE 3A CHRONIC KIDNEY DISEASE: ICD-10-CM

## 2024-01-31 DIAGNOSIS — I73.00 RAYNAUD'S DISEASE WITHOUT GANGRENE: ICD-10-CM

## 2024-01-31 DIAGNOSIS — R35.1 NOCTURIA: ICD-10-CM

## 2024-01-31 DIAGNOSIS — E78.00 PURE HYPERCHOLESTEROLEMIA: ICD-10-CM

## 2024-01-31 DIAGNOSIS — E03.9 ACQUIRED HYPOTHYROIDISM: ICD-10-CM

## 2024-01-31 DIAGNOSIS — K21.9 GASTROESOPHAGEAL REFLUX DISEASE WITHOUT ESOPHAGITIS: ICD-10-CM

## 2024-01-31 PROBLEM — M79.89 SWELLING OF TOE OF LEFT FOOT: Status: RESOLVED | Noted: 2019-09-03 | Resolved: 2024-01-31

## 2024-01-31 PROBLEM — M75.51 ACUTE BURSITIS OF RIGHT SHOULDER: Status: RESOLVED | Noted: 2023-01-09 | Resolved: 2024-01-31

## 2024-01-31 PROBLEM — M75.121 COMPLETE TEAR OF RIGHT ROTATOR CUFF: Status: RESOLVED | Noted: 2023-03-17 | Resolved: 2024-01-31

## 2024-01-31 PROCEDURE — 99396 PREV VISIT EST AGE 40-64: CPT | Performed by: FAMILY MEDICINE

## 2024-01-31 RX ORDER — ROSUVASTATIN CALCIUM 10 MG/1
10 TABLET, COATED ORAL NIGHTLY
Qty: 90 TABLET | Refills: 3 | Status: SHIPPED | OUTPATIENT
Start: 2024-01-31 | End: 2025-01-30

## 2024-01-31 RX ORDER — LEVOTHYROXINE SODIUM 0.1 MG/1
100 TABLET ORAL EVERY EVENING
Qty: 90 TABLET | Refills: 3 | Status: SHIPPED | OUTPATIENT
Start: 2024-01-31 | End: 2025-01-30

## 2024-01-31 RX ORDER — PANTOPRAZOLE SODIUM 40 MG/1
40 TABLET, DELAYED RELEASE ORAL
Qty: 90 TABLET | Refills: 3 | Status: SHIPPED | OUTPATIENT
Start: 2024-01-31 | End: 2025-01-30

## 2024-01-31 NOTE — ASSESSMENT & PLAN NOTE
Renal condition is improving with treatment.  Continue current treatment regimen.  Renal condition will be reassessed in 1 year

## 2024-01-31 NOTE — PROGRESS NOTES
"Chief Complaint  Annual Exam, Hyperlipidemia, Med Refill, and Hypothyroidism    Subjective        Hyperlipidemia  Exacerbating diseases include hypothyroidism. Pertinent negatives include no myalgias.   Hypothyroidism  Pertinent negatives include no arthralgias, congestion, fatigue, fever, headaches, myalgias or rash.      Jamie presents to [facility] for an annual wellness exam and also to review labs and refill current medications. Overall he feels well. No medication side effects are reported. The following were discussed during today's preventative wellness exam: Nutrition, Physical activity, Healthy weight, Immunizations, and Screenings    Patient is doing much better from his recent shoulder surgery.  Labs have been reviewed and are seen below.  Previously abnormal labs are now back within the normal limits.  Review of Systems   Constitutional:  Negative for fatigue and fever.   HENT:  Positive for postnasal drip. Negative for congestion.    Eyes: Negative.    Respiratory: Negative.     Cardiovascular: Negative.    Gastrointestinal: Negative.    Genitourinary:  Negative for difficulty urinating.   Musculoskeletal:  Negative for arthralgias and myalgias.   Skin:  Negative for rash.   Neurological:  Negative for dizziness.   Hematological:  Does not bruise/bleed easily.   Psychiatric/Behavioral:  Negative for dysphoric mood. The patient is not nervous/anxious.    All other systems reviewed and are negative.       Objective   Vital Signs:   Vitals:    01/31/24 1008   BP: 124/70   Pulse: 88   Resp: 16   SpO2: 99%   Weight: 88.9 kg (196 lb)   Height: 188 cm (74\")   PainSc: 0-No pain            7/6/2023    10:55 AM   PHQ-2/PHQ-9 Depression Screening   Little Interest or Pleasure in Doing Things 0-->not at all   Feeling Down, Depressed or Hopeless 0-->not at all   PHQ-9: Brief Depression Severity Measure Score 0                 Physical Exam  Constitutional:       Appearance: Normal " appearance. He is well-developed.   HENT:      Head: Normocephalic.      Right Ear: Hearing, tympanic membrane and external ear normal.      Left Ear: Hearing, tympanic membrane and external ear normal.   Eyes:      General: Lids are normal.      Conjunctiva/sclera: Conjunctivae normal.      Pupils: Pupils are equal, round, and reactive to light.      Funduscopic exam:     Right eye: No AV nicking or papilledema.         Left eye: No AV nicking or papilledema.   Neck:      Thyroid: No thyroid mass or thyromegaly.      Vascular: No carotid bruit or JVD.      Trachea: Trachea normal.   Cardiovascular:      Rate and Rhythm: Normal rate and regular rhythm.      Pulses: Normal pulses.      Heart sounds: Normal heart sounds. No murmur heard.  Pulmonary:      Effort: Pulmonary effort is normal.      Breath sounds: Normal breath sounds.   Abdominal:      General: Bowel sounds are normal.      Palpations: Abdomen is soft.      Tenderness: There is no abdominal tenderness.   Musculoskeletal:         General: Normal range of motion.      Cervical back: Normal range of motion and neck supple.      Lumbar back: No bony tenderness.   Lymphadenopathy:      Cervical: No cervical adenopathy.      Upper Body:      Right upper body: No supraclavicular adenopathy.      Left upper body: No supraclavicular adenopathy.   Skin:     General: Skin is warm and dry.      Findings: No rash.      Nails: There is no clubbing.   Neurological:      Mental Status: He is alert and oriented to person, place, and time.      Cranial Nerves: No cranial nerve deficit.      Deep Tendon Reflexes:      Reflex Scores:       Patellar reflexes are 2+ on the right side and 2+ on the left side.  Psychiatric:         Speech: Speech normal.         Behavior: Behavior normal.         Thought Content: Thought content normal.         Judgment: Judgment normal.          Result Review :     The following data was reviewed by: Gilson Lancaster MD on 01/31/2024:  PSA  DIAGNOSTIC (01/03/2024 10:55)  TSH (01/03/2024 10:55)  CK (01/03/2024 10:55)  Lipid Panel With LDL / HDL Ratio (01/03/2024 10:55)  CBC & Differential (01/03/2024 10:55)  Comprehensive Metabolic Panel (01/03/2024 10:55)         Assessment and Plan    Assessment & Plan  Encounter for wellness examination in adult  Immunizations recommended include annual flu shot, RSV vaccine x 1, and most recent COVID vaccination.  Patient will be due for Shingrix vaccine series in 2026.  Acquired hypothyroidism  The current medical regimen is effective;  continue present plan and medications.    Gastroesophageal reflux disease without esophagitis  The current medical regimen is effective;  continue present plan and medications.    Pure hypercholesterolemia  The current medical regimen is effective;  continue present plan and medications.    Nocturia  Nocturia symptoms are stable. PSA will be monitored with annual labs.     Stage 3a chronic kidney disease  Renal condition is improving with treatment.  Continue current treatment regimen.  Renal condition will be reassessed in 1 year  Raynaud's disease without gangrene  resolved    Orders Placed This Encounter   Procedures    Comprehensive Metabolic Panel    Lipid Panel With / Chol / HDL Ratio    CK    TSH    PSA DIAGNOSTIC    CBC & Differential     New Medications Ordered This Visit   Medications    levothyroxine (SYNTHROID, LEVOTHROID) 100 MCG tablet     Sig: Take 1 tablet by mouth Every Evening.     Dispense:  90 tablet     Refill:  3    pantoprazole (PROTONIX) 40 MG EC tablet     Sig: Take 1 tablet by mouth Every Morning Before Breakfast.     Dispense:  90 tablet     Refill:  3    rosuvastatin (CRESTOR) 10 MG tablet     Sig: Take 1 tablet by mouth Every Night.     Dispense:  90 tablet     Refill:  3          Follow Up   Return in about 1 year (around 1/31/2025).  Patient was given instructions and counseling regarding his condition or for health maintenance advice. Please see  specific information pulled into the AVS if appropriate.

## 2024-01-31 NOTE — PATIENT INSTRUCTIONS
Annual flu shot has been recommended to patient. If you are age 65 or greater, then get the high dose influenza vaccination. Optimal timing of this vaccination is in mid October of each year.    Influenza (Flu) Vaccine (Inactivated or Recombinant): What You Need to Know  1. Why get vaccinated?  Influenza vaccine can prevent influenza (flu).  Flu is a contagious disease that spreads around the [place] every year, usually between October and May. Anyone can get the flu, but it is more dangerous for some people. Infants and young children, people 65 years and older, pregnant people, and people with certain health conditions or a weakened immune system are at greatest risk of flu complications.  Pneumonia, bronchitis, sinus infections, and ear infections are examples of flu-related complications. If you have a medical condition, such as heart disease, cancer, or diabetes, flu can make it worse.  Flu can cause fever and chills, sore throat, muscle aches, fatigue, cough, headache, and runny or stuffy nose. Some people may have vomiting and diarrhea, though this is more common in children than adults.  In an average year, thousands of people in the [place] die from flu, and many more are hospitalized. Flu vaccine prevents millions of illnesses and flu-related visits to the doctor each year.  2. Influenza vaccines  CDC recommends everyone 6 months and older get vaccinated every flu season. Children 6 months through 8 years of age may need 2 doses during a single flu season. Everyone else needs only 1 dose each flu season.  It takes about 2 weeks for protection to develop after vaccination.  There are many flu viruses, and they are always changing. Each year a new flu vaccine is made to protect against the influenza viruses believed to be likely to cause disease in the upcoming flu season. Even when the vaccine doesn't exactly match these viruses, it may still provide some protection.  Influenza vaccine does not  "cause flu.  Influenza vaccine may be given at the same time as other vaccines.  3. Talk with your health care provider  Tell your vaccination provider if the person getting the vaccine:  Has had an allergic reaction after a previous dose of influenza vaccine, or has any severe, life-threatening allergies  Has ever had Guillain-Barré Syndrome (also called \"GBS\")  In some cases, your health care provider may decide to postpone influenza vaccination until a future visit.  Influenza vaccine can be administered at any time during pregnancy. People who are or will be pregnant during influenza season should receive inactivated influenza vaccine.  People with minor illnesses, such as a cold, may be vaccinated. People who are moderately or severely ill should usually wait until they recover before getting influenza vaccine.  Your health care provider can give you more information.  4. Risks of a vaccine reaction  Soreness, redness, and swelling where the shot is given, fever, muscle aches, and headache can happen after influenza vaccination.  There may be a very small increased risk of Guillain-Barré Syndrome (GBS) after inactivated influenza vaccine (the flu shot).  Young children who get the flu shot along with pneumococcal vaccine (PCV13) and/or DTaP vaccine at the same time might be slightly more likely to have a seizure caused by fever. Tell your health care provider if a child who is getting flu vaccine has ever had a seizure.  People sometimes faint after medical procedures, including vaccination. Tell your provider if you feel dizzy or have vision changes or ringing in the ears.  As with any medicine, there is a very remote chance of a vaccine causing a severe allergic reaction, other serious injury, or death.  5. What if there is a serious problem?  An allergic reaction could occur after the vaccinated person leaves the clinic. If you see signs of a severe allergic reaction (hives, swelling of the face and throat, " difficulty breathing, a fast heartbeat, dizziness, or weakness), call 9-1-1 and get the person to the nearest hospital.  For other signs that concern you, call your health care provider.  Adverse reactions should be reported to the Vaccine Adverse Event Reporting System (VAERS). Your health care provider will usually file this report, or you can do it yourself. Visit the VAERS website at www.vaers.[facility].gov or call 1-665.467.2538. VAERS is only for reporting reactions, and VAERS staff members do not give medical advice.  6. The National Vaccine Injury Compensation Program  The National Vaccine Injury Compensation Program (VICP) is a federal program that was created to compensate people who may have been injured by certain vaccines. Claims regarding alleged injury or death due to vaccination have a time limit for filing, which may be as short as two years. Visit the VICP website at www.[facility].gov/vaccinecompensation or call 1-299.450.8120 to learn about the program and about filing a claim.  7. How can I learn more?  Ask your health care provider.  Call your local or state health department.  Visit the website of the Food and Drug Administration (FDA) for vaccine package inserts and additional information at www.fda.gov/vaccines-blood-biologics/vaccines.  Contact the Centers for Disease Control and Prevention (CDC):  Call 1-319.440.5114 (2-860-BHP-INFO) or  Visit CDC's website at www.cdc.gov/flu.  Source: CDC Vaccine Information Statement Inactivated Influenza Vaccine (8/6/2021)  This same material is available at www.cdc.gov for no charge.  This information is not intended to replace advice given to you by your health care provider. Make sure you discuss any questions you have with your health care provider.  Document Revised: 11/15/2022 Document Reviewed: 09/08/2022  Elsevier Patient Education © 2023 [employer] Inc.     RSV vaccine is recommended to prevent Respiratory syncytial virus infection. It is recommended for  adults over age 60. Please get this vaccination at your local pharmacy.  I have included some information about this infection for your consideration.     Respiratory Syncytial Virus Infection, Adult  Respiratory syncytial virus (RSV) infection is an infection caused by RSV, a common virus. This virus is similar to viruses that cause the common cold and the flu. RSV infection can affect the nose, throat, windpipe, and lungs (respiratory system). When the infection is severe, it can cause:  Bronchiolitis. This condition causes inflammation of the air passages in the lungs (bronchioles).  Pneumonia. This condition causes inflammation of the air sacs in the lungs.  RSV infection spreads from person to person (is contagious) through droplets from coughs and sneezes (respiratory secretions). This condition is rarely serious when it occurs in adults.  What are the causes?  This condition is caused by contact with RSV. This can happen by:  Breathing respiratory secretions from someone who has the infection.  Touching something that has been exposed to the virus (is contaminated) and then touching your mouth, nose, or eyes.  Coming in close contact with someone who has this infection. This may happen if you:  Hug or kiss.  Shake or hold hands.  Eat or drink using the same dishes or utensils.  What increases the risk?  The following factors may make you more likely to develop this condition:  Being 65 years of age or older.  Having certain health conditions, including:  A long-term (chronic) lung condition, such as chronic obstructive pulmonary disease (COPD).  An immune system that is weak. This is your body's defense system.  Down syndrome.  Heart disease.  Working in a hospital or other health care facility.  Living in a long-term health care facility.  RSV infections are most common from the months of November to April, but they can happen any time of year.  What are the signs or symptoms?  Symptoms of this condition  include:  Having a runny nose.  Coughing. You may have a cough that brings up mucus (productive cough).  Sneezing.  Having a fever.  Wanting to eat less than usual.  Breathing loudly (wheezing).  Having shortness of breath.  Having fluid build up in the lungs (respiratory distress).  How is this diagnosed?  This condition may be diagnosed based on:  Your symptoms.  Your medical history.  A physical exam.  A chest X-ray to rule out pneumonia.  Blood tests or tests of mucus from your lungs (sputum). These tests may be done for older adults.  A test of a sample of your respiratory secretions.  How is this treated?  In most cases, the RSV infection will go away after 1-2 weeks of caring for yourself at home.   Sometimes, RSV infection is severe and can cause bronchiolitis or pneumonia. If you develop one or both of these conditions, you may need to be treated in the hospital. You may be given:  Oxygen therapy.  Antiviral medicine.  Medicines to open your bronchioles (bronchodilators).  Follow these instructions at home:  Medicines  Take over-the-counter and prescription medicines only as told by your health care provider.  If you were prescribed an antiviral medicine, take it as told by your health care provider. Do not stop using the antiviral even if you start to feel better.  Lifestyle    Eat a healthy diet.  Do not drink alcohol.  Do not use any products that contain nicotine or tobacco, such as cigarettes, e-cigarettes, and chewing tobacco. If you need help quitting, ask your health care provider.  Rest at home until your symptoms go away.  Return to your normal activities as told by your health care provider. Ask your health care provider what activities are safe for you.  General instructions    Drink enough fluid to keep your urine pale yellow.  Gargle with a salt-water mixture 3-4 times a day or as needed. To make a salt-water mixture, completely dissolve ½-1 tsp (3-6 g) of salt in 1 cup (237 mL) of warm  water.  Keep all follow-up visits as told by your health care provider. This is important.  How is this prevented?  To prevent catching and spreading RSV:  Wash your hands often with soap and water for at least 20 seconds. If soap and water are not available, use hand . Do not touch your face without first cleaning your hands.  Stay home if you have symptoms of the common cold or the flu.  Cover your nose and mouth when you cough or sneeze.  Avoid large groups of people.  Keep a safe distance of about 6 feet (1.8 m) from people who are coughing or sneezing.  Where to find more information  Centers for Disease Control and Prevention: www.cdc.gov  Contact a health care provider if:  Your symptoms get worse or have not changed after 2 weeks.  You have:  A fever.  Hot flashes, sweating, or chills that keep happening.  A cough that brings up much more mucus than usual.  A cough that brings up blood.  You feel:  Very tired (lethargic).  Confused.  Get help right away if:  You have increased or severe trouble breathing.  You lose consciousness.  These symptoms may represent a serious problem that is an emergency. Do not wait to see if the symptoms will go away. Get medical help right away. Call your local emergency services (911 in the [place] Do not drive yourself to the hospital.  Summary  Respiratory syncytial virus (RSV) infection is an infection caused by RSV, a common virus. RSV infection can affect the nose, throat, windpipe, and lungs (respiratory system).  When the infection is severe, it can cause bronchiolitis or pneumonia.  Take over-the-counter and prescription medicines only as told by your health care provider.  Contact a health care provider if your symptoms get worse or have not changed after 2 weeks.  This information is not intended to replace advice given to you by your health care provider. Make sure you discuss any questions you have with your health care provider.  Document Revised:  09/30/2020 Document Reviewed: 10/07/2020  [employer] Patient Education © 2022 [employer]     You are due for Covid booster. Please get this vaccine at your local pharmacy. COVID-19 vaccine may be administered without regards to timing of other vaccines.  If administering on the same day as another vaccine, administer in a different site (arm).  COVID-19 VACCINE reduces the risk of COVID-19. It does not treat COVID-19. It is still possible to get COVID-19 after receiving this vaccine, but the symptoms may be less severe or not last as long. It works by helping your immune system learn how to fight off a future infection.     What side effects may I notice from receiving this medication?  Side effects that you should report to your care team as soon as possible:  Allergic reactions--skin rash, itching, hives, swelling of the face, lips, tongue, or throat  Heart muscle inflammation--unusual weakness or fatigue, shortness of breath, chest pain, fast or irregular heartbeat, dizziness, swelling of the ankles, feet, or hands  Side effects that usually do not require medical attention (report these to your care team if they continue or are bothersome):  Chills  Fatigue  Fever  Headache  Joint pain  Muscle pain  Nausea  Pain, redness, or irritation at injection site  Swollen lymph nodes  Vomiting  This list may not describe all possible side effects. Call your doctor for medical advice about side effects. You may report side effects to FDA at 5-762-FDA-6937.     Exercising to Stay Healthy  To become healthy and stay healthy, it is recommended that you do moderate-intensity and vigorous-intensity exercise. You can tell that you are exercising at a moderate intensity if your heart starts beating faster and you start breathing faster but can still hold a conversation. You can tell that you are exercising at a vigorous intensity if you are breathing much harder and faster and cannot hold a conversation while exercising.  How  can exercise benefit me?  Exercising regularly is important. It has many health benefits, such as:  Improving overall fitness, flexibility, and endurance.  Increasing bone density.  Helping with weight control.  Decreasing body fat.  Increasing muscle strength and endurance.  Reducing stress and tension, anxiety, depression, or anger.  Improving overall health.  What guidelines should I follow while exercising?  Before you start a new exercise program, talk with your health care provider.  Do not exercise so much that you hurt yourself, feel dizzy, or get very short of breath.  Wear comfortable clothes and wear shoes with good support.  Drink plenty of water while you exercise to prevent dehydration or heat stroke.  Work out until your breathing and your heartbeat get faster (moderate intensity).  How often should I exercise?  Choose an activity that you enjoy, and set realistic goals. Your health care provider can help you make an activity plan that is individually designed and works best for you.  Exercise regularly as told by your health care provider. This may include:  Doing strength training two times a week, such as:  Lifting weights.  Using resistance bands.  Push-ups.  Sit-ups.  Yoga.  Doing a certain intensity of exercise for a given amount of time. Choose from these options:  A total of 150 minutes of moderate-intensity exercise every week.  A total of 75 minutes of vigorous-intensity exercise every week.  A mix of moderate-intensity and vigorous-intensity exercise every week.  Children, pregnant women, people who have not exercised regularly, people who are overweight, and older adults may need to talk with a health care provider about what activities are safe to perform. If you have a medical condition, be sure to talk with your health care provider before you start a new exercise program.  What are some exercise ideas?  Moderate-intensity exercise ideas include:  Walking 1 mile (1.6 km) in about 15  minutes.  Biking.  Hiking.  Golfing.  Dancing.  Water aerobics.  Vigorous-intensity exercise ideas include:  Walking 4.5 miles (7.2 km) or more in about 1 hour.  Jogging or running 5 miles (8 km) in about 1 hour.  Biking 10 miles (16.1 km) or more in about 1 hour.  Lap swimming.  Roller-skating or in-line skating.  Cross-country skiing.  Vigorous competitive sports, such as football, basketball, and soccer.  Jumping rope.  Aerobic dancing.  What are some everyday activities that can help me get exercise?  Yard work, such as:  Pushing a .  Raking and bagging leaves.  Washing your car.  Pushing a stroller.  Shoveling snow.  Gardening.  Washing windows or floors.  How can I be more active in my day-to-day activities?  Use stairs instead of an elevator.  Take a walk during your lunch break.  If you drive, park your car farther away from your work or school.  If you take public transportation, get off one stop early and walk the rest of the way.  Stand up or walk around during all of your indoor phone calls.  Get up, stretch, and walk around every 30 minutes throughout the day.  Enjoy exercise with a friend. Support to continue exercising will help you keep a regular routine of activity.  Where to find more information  You can find more information about exercising to stay healthy from:  [place] Department of Health and Human Services: www.hhs.gov  Centers for Disease Control and Prevention (CDC): www.cdc.gov  Summary  Exercising regularly is important. It will improve your overall fitness, flexibility, and endurance.  Regular exercise will also improve your overall health. It can help you control your weight, reduce stress, and improve your bone density.  Do not exercise so much that you hurt yourself, feel dizzy, or get very short of breath.  Before you start a new exercise program, talk with your health care provider.  This information is not intended to replace advice given to you by your health care  provider. Make sure you discuss any questions you have with your health care provider.  Document Revised: 04/15/2022 Document Reviewed: 04/15/2022  Elsevier Patient Education © 2023 [employer] Inc.

## 2024-10-15 VITALS
OXYGEN SATURATION: 94 % | DIASTOLIC BLOOD PRESSURE: 58 MMHG | OXYGEN SATURATION: 98 % | DIASTOLIC BLOOD PRESSURE: 55 MMHG | DIASTOLIC BLOOD PRESSURE: 59 MMHG | TEMPERATURE: 97.1 F | OXYGEN SATURATION: 96 % | HEIGHT: 75 IN | SYSTOLIC BLOOD PRESSURE: 107 MMHG | SYSTOLIC BLOOD PRESSURE: 140 MMHG | HEART RATE: 73 BPM | OXYGEN SATURATION: 97 % | DIASTOLIC BLOOD PRESSURE: 57 MMHG | DIASTOLIC BLOOD PRESSURE: 58 MMHG | RESPIRATION RATE: 14 BRPM | RESPIRATION RATE: 18 BRPM | OXYGEN SATURATION: 98 % | SYSTOLIC BLOOD PRESSURE: 109 MMHG | OXYGEN SATURATION: 98 % | RESPIRATION RATE: 14 BRPM | TEMPERATURE: 97.1 F | TEMPERATURE: 97.1 F | HEART RATE: 75 BPM | HEART RATE: 80 BPM | SYSTOLIC BLOOD PRESSURE: 140 MMHG | SYSTOLIC BLOOD PRESSURE: 146 MMHG | SYSTOLIC BLOOD PRESSURE: 139 MMHG | DIASTOLIC BLOOD PRESSURE: 66 MMHG | SYSTOLIC BLOOD PRESSURE: 139 MMHG | DIASTOLIC BLOOD PRESSURE: 82 MMHG | DIASTOLIC BLOOD PRESSURE: 59 MMHG | DIASTOLIC BLOOD PRESSURE: 53 MMHG | SYSTOLIC BLOOD PRESSURE: 95 MMHG | SYSTOLIC BLOOD PRESSURE: 95 MMHG | SYSTOLIC BLOOD PRESSURE: 140 MMHG | RESPIRATION RATE: 13 BRPM | SYSTOLIC BLOOD PRESSURE: 97 MMHG | SYSTOLIC BLOOD PRESSURE: 140 MMHG | SYSTOLIC BLOOD PRESSURE: 109 MMHG | SYSTOLIC BLOOD PRESSURE: 97 MMHG | DIASTOLIC BLOOD PRESSURE: 53 MMHG | HEART RATE: 80 BPM | SYSTOLIC BLOOD PRESSURE: 146 MMHG | RESPIRATION RATE: 11 BRPM | DIASTOLIC BLOOD PRESSURE: 51 MMHG | OXYGEN SATURATION: 96 % | SYSTOLIC BLOOD PRESSURE: 101 MMHG | HEART RATE: 74 BPM | SYSTOLIC BLOOD PRESSURE: 91 MMHG | RESPIRATION RATE: 18 BRPM | TEMPERATURE: 97.1 F | SYSTOLIC BLOOD PRESSURE: 139 MMHG | WEIGHT: 218 LBS | DIASTOLIC BLOOD PRESSURE: 82 MMHG | SYSTOLIC BLOOD PRESSURE: 109 MMHG | WEIGHT: 218 LBS | RESPIRATION RATE: 16 BRPM | OXYGEN SATURATION: 97 % | HEART RATE: 74 BPM | DIASTOLIC BLOOD PRESSURE: 51 MMHG | RESPIRATION RATE: 9 BRPM | OXYGEN SATURATION: 96 % | SYSTOLIC BLOOD PRESSURE: 146 MMHG | RESPIRATION RATE: 14 BRPM | DIASTOLIC BLOOD PRESSURE: 53 MMHG | OXYGEN SATURATION: 97 % | HEART RATE: 71 BPM | SYSTOLIC BLOOD PRESSURE: 101 MMHG | SYSTOLIC BLOOD PRESSURE: 101 MMHG | SYSTOLIC BLOOD PRESSURE: 139 MMHG | RESPIRATION RATE: 14 BRPM | HEART RATE: 71 BPM | DIASTOLIC BLOOD PRESSURE: 53 MMHG | HEART RATE: 71 BPM | OXYGEN SATURATION: 97 % | RESPIRATION RATE: 18 BRPM | HEART RATE: 75 BPM | WEIGHT: 218 LBS | HEART RATE: 73 BPM | RESPIRATION RATE: 9 BRPM | DIASTOLIC BLOOD PRESSURE: 58 MMHG | SYSTOLIC BLOOD PRESSURE: 95 MMHG | HEIGHT: 75 IN | DIASTOLIC BLOOD PRESSURE: 57 MMHG | SYSTOLIC BLOOD PRESSURE: 139 MMHG | DIASTOLIC BLOOD PRESSURE: 60 MMHG | DIASTOLIC BLOOD PRESSURE: 66 MMHG | RESPIRATION RATE: 16 BRPM | RESPIRATION RATE: 11 BRPM | SYSTOLIC BLOOD PRESSURE: 91 MMHG | HEART RATE: 71 BPM | SYSTOLIC BLOOD PRESSURE: 107 MMHG | SYSTOLIC BLOOD PRESSURE: 96 MMHG | HEART RATE: 80 BPM | OXYGEN SATURATION: 96 % | SYSTOLIC BLOOD PRESSURE: 101 MMHG | SYSTOLIC BLOOD PRESSURE: 140 MMHG | RESPIRATION RATE: 13 BRPM | SYSTOLIC BLOOD PRESSURE: 95 MMHG | DIASTOLIC BLOOD PRESSURE: 58 MMHG | RESPIRATION RATE: 13 BRPM | SYSTOLIC BLOOD PRESSURE: 97 MMHG | SYSTOLIC BLOOD PRESSURE: 96 MMHG | DIASTOLIC BLOOD PRESSURE: 53 MMHG | RESPIRATION RATE: 14 BRPM | HEIGHT: 75 IN | OXYGEN SATURATION: 94 % | SYSTOLIC BLOOD PRESSURE: 109 MMHG | DIASTOLIC BLOOD PRESSURE: 57 MMHG | SYSTOLIC BLOOD PRESSURE: 96 MMHG | RESPIRATION RATE: 15 BRPM | HEART RATE: 75 BPM | OXYGEN SATURATION: 96 % | DIASTOLIC BLOOD PRESSURE: 55 MMHG | SYSTOLIC BLOOD PRESSURE: 95 MMHG | RESPIRATION RATE: 16 BRPM | HEART RATE: 76 BPM | HEART RATE: 72 BPM | RESPIRATION RATE: 9 BRPM | RESPIRATION RATE: 15 BRPM | SYSTOLIC BLOOD PRESSURE: 101 MMHG | SYSTOLIC BLOOD PRESSURE: 107 MMHG | SYSTOLIC BLOOD PRESSURE: 91 MMHG | OXYGEN SATURATION: 97 % | HEART RATE: 72 BPM | DIASTOLIC BLOOD PRESSURE: 86 MMHG | HEART RATE: 73 BPM | OXYGEN SATURATION: 97 % | SYSTOLIC BLOOD PRESSURE: 109 MMHG | HEART RATE: 76 BPM | RESPIRATION RATE: 18 BRPM | DIASTOLIC BLOOD PRESSURE: 58 MMHG | SYSTOLIC BLOOD PRESSURE: 97 MMHG | RESPIRATION RATE: 15 BRPM | SYSTOLIC BLOOD PRESSURE: 139 MMHG | OXYGEN SATURATION: 97 % | HEART RATE: 71 BPM | DIASTOLIC BLOOD PRESSURE: 60 MMHG | WEIGHT: 218 LBS | HEART RATE: 73 BPM | HEART RATE: 76 BPM | RESPIRATION RATE: 13 BRPM | OXYGEN SATURATION: 94 % | OXYGEN SATURATION: 95 % | RESPIRATION RATE: 16 BRPM | HEART RATE: 73 BPM | DIASTOLIC BLOOD PRESSURE: 60 MMHG | DIASTOLIC BLOOD PRESSURE: 86 MMHG | DIASTOLIC BLOOD PRESSURE: 51 MMHG | TEMPERATURE: 97.4 F | HEART RATE: 72 BPM | RESPIRATION RATE: 14 BRPM | HEART RATE: 76 BPM | OXYGEN SATURATION: 98 % | SYSTOLIC BLOOD PRESSURE: 107 MMHG | HEART RATE: 71 BPM | OXYGEN SATURATION: 95 % | RESPIRATION RATE: 14 BRPM | DIASTOLIC BLOOD PRESSURE: 53 MMHG | RESPIRATION RATE: 15 BRPM | OXYGEN SATURATION: 98 % | WEIGHT: 218 LBS | SYSTOLIC BLOOD PRESSURE: 97 MMHG | DIASTOLIC BLOOD PRESSURE: 59 MMHG | DIASTOLIC BLOOD PRESSURE: 82 MMHG | RESPIRATION RATE: 11 BRPM | OXYGEN SATURATION: 98 % | RESPIRATION RATE: 13 BRPM | HEART RATE: 80 BPM | DIASTOLIC BLOOD PRESSURE: 86 MMHG | SYSTOLIC BLOOD PRESSURE: 96 MMHG | DIASTOLIC BLOOD PRESSURE: 60 MMHG | SYSTOLIC BLOOD PRESSURE: 97 MMHG | DIASTOLIC BLOOD PRESSURE: 59 MMHG | DIASTOLIC BLOOD PRESSURE: 86 MMHG | HEART RATE: 74 BPM | HEIGHT: 75 IN | OXYGEN SATURATION: 95 % | SYSTOLIC BLOOD PRESSURE: 91 MMHG | SYSTOLIC BLOOD PRESSURE: 95 MMHG | OXYGEN SATURATION: 94 % | DIASTOLIC BLOOD PRESSURE: 55 MMHG | TEMPERATURE: 97.4 F | RESPIRATION RATE: 16 BRPM | HEART RATE: 75 BPM | HEART RATE: 74 BPM | RESPIRATION RATE: 11 BRPM | DIASTOLIC BLOOD PRESSURE: 57 MMHG | OXYGEN SATURATION: 98 % | HEART RATE: 71 BPM | WEIGHT: 218 LBS | DIASTOLIC BLOOD PRESSURE: 86 MMHG | RESPIRATION RATE: 18 BRPM | SYSTOLIC BLOOD PRESSURE: 107 MMHG | DIASTOLIC BLOOD PRESSURE: 86 MMHG | SYSTOLIC BLOOD PRESSURE: 91 MMHG | DIASTOLIC BLOOD PRESSURE: 82 MMHG | HEART RATE: 72 BPM | HEART RATE: 76 BPM | SYSTOLIC BLOOD PRESSURE: 146 MMHG | OXYGEN SATURATION: 96 % | HEART RATE: 74 BPM | SYSTOLIC BLOOD PRESSURE: 91 MMHG | DIASTOLIC BLOOD PRESSURE: 55 MMHG | DIASTOLIC BLOOD PRESSURE: 60 MMHG | HEIGHT: 75 IN | DIASTOLIC BLOOD PRESSURE: 66 MMHG | HEART RATE: 73 BPM | OXYGEN SATURATION: 94 % | SYSTOLIC BLOOD PRESSURE: 95 MMHG | SYSTOLIC BLOOD PRESSURE: 139 MMHG | RESPIRATION RATE: 11 BRPM | TEMPERATURE: 97.1 F | OXYGEN SATURATION: 95 % | SYSTOLIC BLOOD PRESSURE: 146 MMHG | HEART RATE: 72 BPM | HEART RATE: 80 BPM | DIASTOLIC BLOOD PRESSURE: 58 MMHG | OXYGEN SATURATION: 95 % | DIASTOLIC BLOOD PRESSURE: 66 MMHG | TEMPERATURE: 97.4 F | RESPIRATION RATE: 11 BRPM | DIASTOLIC BLOOD PRESSURE: 66 MMHG | RESPIRATION RATE: 9 BRPM | HEART RATE: 74 BPM | TEMPERATURE: 97.1 F | OXYGEN SATURATION: 94 % | SYSTOLIC BLOOD PRESSURE: 91 MMHG | SYSTOLIC BLOOD PRESSURE: 96 MMHG | DIASTOLIC BLOOD PRESSURE: 51 MMHG | HEART RATE: 75 BPM | SYSTOLIC BLOOD PRESSURE: 109 MMHG | HEIGHT: 75 IN | DIASTOLIC BLOOD PRESSURE: 55 MMHG | DIASTOLIC BLOOD PRESSURE: 55 MMHG | OXYGEN SATURATION: 96 % | RESPIRATION RATE: 16 BRPM | DIASTOLIC BLOOD PRESSURE: 86 MMHG | DIASTOLIC BLOOD PRESSURE: 66 MMHG | DIASTOLIC BLOOD PRESSURE: 66 MMHG | HEART RATE: 76 BPM | DIASTOLIC BLOOD PRESSURE: 59 MMHG | RESPIRATION RATE: 9 BRPM | DIASTOLIC BLOOD PRESSURE: 82 MMHG | RESPIRATION RATE: 18 BRPM | RESPIRATION RATE: 18 BRPM | OXYGEN SATURATION: 95 % | TEMPERATURE: 97.1 F | HEART RATE: 72 BPM | SYSTOLIC BLOOD PRESSURE: 146 MMHG | DIASTOLIC BLOOD PRESSURE: 57 MMHG | TEMPERATURE: 97.4 F | RESPIRATION RATE: 15 BRPM | RESPIRATION RATE: 9 BRPM | SYSTOLIC BLOOD PRESSURE: 101 MMHG | DIASTOLIC BLOOD PRESSURE: 59 MMHG | SYSTOLIC BLOOD PRESSURE: 109 MMHG | DIASTOLIC BLOOD PRESSURE: 51 MMHG | DIASTOLIC BLOOD PRESSURE: 57 MMHG | RESPIRATION RATE: 13 BRPM | OXYGEN SATURATION: 94 % | HEART RATE: 80 BPM | HEIGHT: 75 IN | RESPIRATION RATE: 9 BRPM | RESPIRATION RATE: 15 BRPM | SYSTOLIC BLOOD PRESSURE: 96 MMHG | TEMPERATURE: 97.4 F | DIASTOLIC BLOOD PRESSURE: 82 MMHG | HEART RATE: 75 BPM | DIASTOLIC BLOOD PRESSURE: 82 MMHG | RESPIRATION RATE: 11 BRPM | RESPIRATION RATE: 15 BRPM | RESPIRATION RATE: 13 BRPM | HEART RATE: 75 BPM | SYSTOLIC BLOOD PRESSURE: 146 MMHG | SYSTOLIC BLOOD PRESSURE: 140 MMHG | HEART RATE: 76 BPM | SYSTOLIC BLOOD PRESSURE: 107 MMHG | SYSTOLIC BLOOD PRESSURE: 97 MMHG | HEART RATE: 73 BPM | SYSTOLIC BLOOD PRESSURE: 101 MMHG | DIASTOLIC BLOOD PRESSURE: 53 MMHG | HEART RATE: 74 BPM | SYSTOLIC BLOOD PRESSURE: 107 MMHG | RESPIRATION RATE: 16 BRPM | DIASTOLIC BLOOD PRESSURE: 59 MMHG | SYSTOLIC BLOOD PRESSURE: 140 MMHG | DIASTOLIC BLOOD PRESSURE: 58 MMHG | DIASTOLIC BLOOD PRESSURE: 55 MMHG | TEMPERATURE: 97.4 F | SYSTOLIC BLOOD PRESSURE: 96 MMHG | HEART RATE: 72 BPM | OXYGEN SATURATION: 95 % | DIASTOLIC BLOOD PRESSURE: 57 MMHG | DIASTOLIC BLOOD PRESSURE: 51 MMHG | DIASTOLIC BLOOD PRESSURE: 60 MMHG | TEMPERATURE: 97.4 F | DIASTOLIC BLOOD PRESSURE: 51 MMHG | DIASTOLIC BLOOD PRESSURE: 60 MMHG | WEIGHT: 218 LBS | HEART RATE: 80 BPM

## 2024-10-17 PROBLEM — Z86.010 PERSONAL HISTORY OF COLONIC POLYPS: Status: ACTIVE | Noted: 2024-10-18

## 2024-10-18 ENCOUNTER — AMBULATORY SURGICAL CENTER (AMBULATORY)
Age: 59
End: 2024-10-18
Payer: COMMERCIAL

## 2024-10-18 ENCOUNTER — OFFICE (AMBULATORY)
Age: 59
End: 2024-10-18

## 2024-10-18 ENCOUNTER — OFFICE (AMBULATORY)
Dept: URBAN - METROPOLITAN AREA PATHOLOGY 4 | Facility: PATHOLOGY | Age: 59
End: 2024-10-18

## 2024-10-18 ENCOUNTER — AMBULATORY SURGICAL CENTER (AMBULATORY)
Dept: URBAN - METROPOLITAN AREA SURGERY 17 | Facility: SURGERY | Age: 59
End: 2024-10-18
Payer: COMMERCIAL

## 2024-10-18 DIAGNOSIS — K63.5 POLYP OF COLON: ICD-10-CM

## 2024-10-18 DIAGNOSIS — D12.3 BENIGN NEOPLASM OF TRANSVERSE COLON: ICD-10-CM

## 2024-10-18 DIAGNOSIS — D12.4 BENIGN NEOPLASM OF DESCENDING COLON: ICD-10-CM

## 2024-10-18 DIAGNOSIS — D12.0 BENIGN NEOPLASM OF CECUM: ICD-10-CM

## 2024-10-18 DIAGNOSIS — D12.2 BENIGN NEOPLASM OF ASCENDING COLON: ICD-10-CM

## 2024-10-18 DIAGNOSIS — Z86.0102 PERSONAL HISTORY OF HYPERPLASTIC COLON POLYPS: ICD-10-CM

## 2024-10-18 DIAGNOSIS — Z09 ENCOUNTER FOR FOLLOW-UP EXAMINATION AFTER COMPLETED TREATMEN: ICD-10-CM

## 2024-10-18 LAB
GI HISTOLOGY: A. CECUM: (no result)
GI HISTOLOGY: B. MID-ASCENDING COLON: (no result)
GI HISTOLOGY: C. HEPATIC FLEXURE: (no result)
GI HISTOLOGY: PDF REPORT: (no result)
Lab: (no result)

## 2024-10-18 PROCEDURE — 45385 COLONOSCOPY W/LESION REMOVAL: CPT | Mod: 33 | Performed by: INTERNAL MEDICINE

## 2024-10-18 PROCEDURE — 88305 TISSUE EXAM BY PATHOLOGIST: CPT | Performed by: PATHOLOGY

## 2025-02-17 ENCOUNTER — OFFICE VISIT (OUTPATIENT)
Dept: FAMILY MEDICINE CLINIC | Facility: CLINIC | Age: 60
End: 2025-02-17
Payer: COMMERCIAL

## 2025-02-17 VITALS
WEIGHT: 214 LBS | SYSTOLIC BLOOD PRESSURE: 118 MMHG | HEART RATE: 84 BPM | HEIGHT: 74 IN | BODY MASS INDEX: 27.46 KG/M2 | OXYGEN SATURATION: 99 % | DIASTOLIC BLOOD PRESSURE: 64 MMHG

## 2025-02-17 DIAGNOSIS — R79.89 ELEVATED SERUM CREATININE: ICD-10-CM

## 2025-02-17 DIAGNOSIS — E78.00 PURE HYPERCHOLESTEROLEMIA: ICD-10-CM

## 2025-02-17 DIAGNOSIS — Z00.00 ENCOUNTER FOR WELLNESS EXAMINATION IN ADULT: Primary | ICD-10-CM

## 2025-02-17 DIAGNOSIS — K21.9 GASTROESOPHAGEAL REFLUX DISEASE WITHOUT ESOPHAGITIS: ICD-10-CM

## 2025-02-17 DIAGNOSIS — E03.9 ACQUIRED HYPOTHYROIDISM: ICD-10-CM

## 2025-02-17 DIAGNOSIS — Z23 IMMUNIZATION DUE: ICD-10-CM

## 2025-02-17 PROCEDURE — 90471 IMMUNIZATION ADMIN: CPT | Performed by: FAMILY MEDICINE

## 2025-02-17 PROCEDURE — 90715 TDAP VACCINE 7 YRS/> IM: CPT | Performed by: FAMILY MEDICINE

## 2025-02-17 PROCEDURE — 90472 IMMUNIZATION ADMIN EACH ADD: CPT | Performed by: FAMILY MEDICINE

## 2025-02-17 PROCEDURE — 90656 IIV3 VACC NO PRSV 0.5 ML IM: CPT | Performed by: FAMILY MEDICINE

## 2025-02-17 PROCEDURE — 99396 PREV VISIT EST AGE 40-64: CPT | Performed by: FAMILY MEDICINE

## 2025-02-17 RX ORDER — PANTOPRAZOLE SODIUM 20 MG/1
20 TABLET, DELAYED RELEASE ORAL
Qty: 90 TABLET | Refills: 3 | Status: SHIPPED | OUTPATIENT
Start: 2025-02-17 | End: 2026-02-17

## 2025-02-17 RX ORDER — LEVOTHYROXINE SODIUM 100 UG/1
100 TABLET ORAL EVERY EVENING
Qty: 90 TABLET | Refills: 3 | Status: SHIPPED | OUTPATIENT
Start: 2025-02-17 | End: 2026-02-17

## 2025-02-17 RX ORDER — ROSUVASTATIN CALCIUM 10 MG/1
10 TABLET, COATED ORAL NIGHTLY
Qty: 90 TABLET | Refills: 3 | Status: SHIPPED | OUTPATIENT
Start: 2025-02-17 | End: 2026-02-17

## 2025-02-17 NOTE — ASSESSMENT & PLAN NOTE
Condition is stable. The current medical regimen is effective;  continue present plan and medications.  Orders:    rosuvastatin (CRESTOR) 10 MG tablet; Take 1 tablet by mouth Every Night.

## 2025-02-17 NOTE — ASSESSMENT & PLAN NOTE
Due to elevated creatinine, decrease pantoprazole to 20 mg a.m. dosing 30 minutes before breakfast.  Recheck labs in 3 months  Orders:    pantoprazole (PROTONIX) 20 MG EC tablet; Take 1 tablet by mouth Every Morning Before Breakfast.

## 2025-02-17 NOTE — ASSESSMENT & PLAN NOTE
The current medical regimen is effective;  continue present plan and medications.    Orders:    levothyroxine (SYNTHROID, LEVOTHROID) 100 MCG tablet; Take 1 tablet by mouth Every Evening.

## 2025-02-17 NOTE — PATIENT INSTRUCTIONS
Exercising to Stay Healthy  To become healthy and stay healthy, it is recommended that you do moderate-intensity and vigorous-intensity exercise. You can tell that you are exercising at a moderate intensity if your heart starts beating faster and you start breathing faster but can still hold a conversation. You can tell that you are exercising at a vigorous intensity if you are breathing much harder and faster and cannot hold a conversation while exercising.  How can exercise benefit me?  Exercising regularly is important. It has many health benefits, such as:  Improving overall fitness, flexibility, and endurance.  Increasing bone density.  Helping with weight control.  Decreasing body fat.  Increasing muscle strength and endurance.  Reducing stress and tension, anxiety, depression, or anger.  Improving overall health.  What guidelines should I follow while exercising?  Before you start a new exercise program, talk with your health care provider.  Do not exercise so much that you hurt yourself, feel dizzy, or get very short of breath.  Wear comfortable clothes and wear shoes with good support.  Drink plenty of water while you exercise to prevent dehydration or heat stroke.  Work out until your breathing and your heartbeat get faster (moderate intensity).  How often should I exercise?  Choose an activity that you enjoy, and set realistic goals. Your health care provider can help you make an activity plan that is individually designed and works best for you.  Exercise regularly as told by your health care provider. This may include:  Doing strength training two times a week, such as:  Lifting weights.  Using resistance bands.  Push-ups.  Sit-ups.  Yoga.  Doing a certain intensity of exercise for a given amount of time. Choose from these options:  A total of 150 minutes of moderate-intensity exercise every week.  A total of 75 minutes of vigorous-intensity exercise every week.  A mix of moderate-intensity and  vigorous-intensity exercise every week.  Children, pregnant women, people who have not exercised regularly, people who are overweight, and older adults may need to talk with a health care provider about what activities are safe to perform. If you have a medical condition, be sure to talk with your health care provider before you start a new exercise program.  What are some exercise ideas?  Moderate-intensity exercise ideas include:  Walking 1 mile (1.6 km) in about 15 minutes.  Biking.  Hiking.  Golfing.  Dancing.  Water aerobics.  Vigorous-intensity exercise ideas include:  Walking 4.5 miles (7.2 km) or more in about 1 hour.  Jogging or running 5 miles (8 km) in about 1 hour.  Biking 10 miles (16.1 km) or more in about 1 hour.  Lap swimming.  Roller-skating or in-line skating.  Cross-country skiing.  Vigorous competitive sports, such as football, basketball, and soccer.  Jumping rope.  Aerobic dancing.  What are some everyday activities that can help me get exercise?  Yard work, such as:  Pushing a .  Raking and bagging leaves.  Washing your car.  Pushing a stroller.  Shoveling snow.  Gardening.  Washing windows or floors.  How can I be more active in my day-to-day activities?  Use stairs instead of an elevator.  Take a walk during your lunch break.  If you drive, park your car farther away from your work or school.  If you take public transportation, get off one stop early and walk the rest of the way.  Stand up or walk around during all of your indoor phone calls.  Get up, stretch, and walk around every 30 minutes throughout the day.  Enjoy exercise with a friend. Support to continue exercising will help you keep a regular routine of activity.  Where to find more information  You can find more information about exercising to stay healthy from:  [place] Department of Health and Human Services: www.hhs.gov  Centers for Disease Control and Prevention (CDC): www.cdc.gov  Summary  Exercising regularly is  "important. It will improve your overall fitness, flexibility, and endurance.  Regular exercise will also improve your overall health. It can help you control your weight, reduce stress, and improve your bone density.  Do not exercise so much that you hurt yourself, feel dizzy, or get very short of breath.  Before you start a new exercise program, talk with your health care provider.  This information is not intended to replace advice given to you by your health care provider. Make sure you discuss any questions you have with your health care provider.  Document Revised: 04/15/2022 Document Reviewed: 04/15/2022  [employer] Patient Education © 2024 [employer]Fat and Cholesterol Restricted Diet  Getting too much fat and cholesterol in your diet may cause health problems. Following this diet helps keep your fat and cholesterol at normal levels. This can keep you from getting sick.  WHAT TYPES OF FAT SHOULD I CHOOSE?  Choose monosaturated and polyunsaturated fats. These are found in foods such as olive oil, canola oil, flaxseeds, walnuts, almonds, and seeds.  Eat more omega-3 fats. Good choices include salmon, mackerel, sardines, tuna, flaxseed oil, and ground flaxseeds.  Limit saturated fats. These are in animal products such as meats, butter, and cream. They can also be in plant products such as palm oil, palm kernel oil, and coconut oil.      Avoid foods with partially hydrogenated oils in them. These contain trans fats. Examples of foods that have trans fats are stick margarine, some tub margarines, cookies, crackers, and other baked goods.  WHAT GENERAL GUIDELINES DO I NEED TO FOLLOW?    Check food labels. Look for the words \"trans fat\" and \"saturated fat.\"  When preparing a meal:  Fill half of your plate with vegetables and green salads.  Fill one fourth of your plate with whole grains. Look for the word \"whole\" as the first word in the ingredient list.  Fill one fourth of your plate with lean protein " foods.  Limit fruit to two servings a day. Choose fruit instead of juice.  Eat more foods with soluble fiber. Examples of foods with this type of fiber are apples, broccoli, carrots, beans, peas, and barley. Try to get 20-30 g (grams) of fiber per day.  Eat more home-cooked foods. Eat less at restaurants and buffets.  Limit or avoid alcohol.  Limit foods high in starch and sugar.Fat and Cholesterol Restricted Eating Plan  Getting too much fat and cholesterol in your diet may cause health problems. Choosing the right foods helps keep your fat and cholesterol at normal levels. This can keep you from getting certain diseases.  Your doctor may recommend an eating plan that includes:  Total fat: ______% or less of total calories a day.  Saturated fat: ______% or less of total calories a day.  Cholesterol: less than _________mg a day.  Fiber: ______g a day.  What are tips for following this plan?  Meal planning  At meals, divide your plate into four equal parts:  Fill one-half of your plate with vegetables and green salads.  Fill one-fourth of your plate with whole grains.  Fill one-fourth of your plate with low-fat (lean) protein foods.  Eat fish that is high in omega-3 fats at least two times a week. This includes mackerel, tuna, sardines, and salmon.  Eat foods that are high in fiber, such as whole grains, beans, apples, broccoli, carrots, peas, and barley.  General tips    Work with your doctor to lose weight if you need to.  Avoid:  Foods with added sugar.  Fried foods.  Foods with partially hydrogenated oils.  Limit alcohol intake to no more than 1 drink a day for nonpregnant women and 2 drinks a day for men. One drink equals 12 oz of beer, 5 oz of wine, or 1½ oz of hard liquor.    Reading food labels  Check food labels for:  Trans fats.  Partially hydrogenated oils.  Saturated fat (g) in each serving.  Cholesterol (mg) in each serving.  Fiber (g) in each serving.  Choose foods with healthy fats, such  "as:  Monounsaturated fats.  Polyunsaturated fats.  Omega-3 fats.  Choose grain products that have whole grains. Look for the word \"whole\" as the first word in the ingredient list.  Cooking  Cook foods using low-fat methods. These include baking, boiling, grilling, and broiling.  Eat more home-cooked foods. Eat at restaurants and buffets less often.  Avoid cooking using saturated fats, such as butter, cream, palm oil, palm kernel oil, and coconut oil.  Recommended foods    Fruits  All fresh, canned (in natural juice), or frozen fruits.  Vegetables  Fresh or frozen vegetables (raw, steamed, roasted, or grilled). Green salads.  Grains  Whole grains, such as whole wheat or whole grain breads, crackers, cereals, and pasta. Unsweetened oatmeal, bulgur, barley, quinoa, or brown rice. Corn or whole wheat flour tortillas.  Meats and other protein foods  Ground beef (85% or leaner), grass-fed beef, or beef trimmed of fat. Skinless chicken or turkey. Ground chicken or turkey. Pork trimmed of fat. All fish and seafood. Egg whites. Dried beans, peas, or lentils. Unsalted nuts or seeds. Unsalted canned beans. Nut butters without added sugar or oil.  Dairy  Low-fat or nonfat dairy products, such as skim or 1% milk, 2% or reduced-fat cheeses, low-fat and fat-free ricotta or cottage cheese, or plain low-fat and nonfat yogurt.  Fats and oils  Tub margarine without trans fats. Light or reduced-fat mayonnaise and salad dressings. Avocado. Olive, canola, sesame, or safflower oils.  The items listed above may not be a complete list of foods and beverages you can eat. Contact a dietitian for more information.  Foods to avoid  Fruits  Canned fruit in heavy syrup. Fruit in cream or butter sauce. Fried fruit.  Vegetables  Vegetables cooked in cheese, cream, or butter sauce. Fried vegetables.  Grains  White bread. White pasta. White rice. Cornbread. Bagels, pastries, and croissants. Crackers and snack foods that contain trans fat and " hydrogenated oils.  Meats and other protein foods  Fatty cuts of meat. Ribs, chicken wings, christensen, sausage, bologna, salami, chitterlings, fatback, hot dogs, bratwurst, and packaged lunch meats. Liver and organ meats. Whole eggs and egg yolks. Chicken and turkey with skin. Fried meat.  Dairy  Whole or 2% milk, cream, half-and-half, and cream cheese. Whole milk cheeses. Whole-fat or sweetened yogurt. Full-fat cheeses. Nondairy creamers and whipped toppings. Processed cheese, cheese spreads, and cheese curds.  Beverages  Alcohol. Sugar-sweetened drinks such as sodas, lemonade, and fruit drinks.  Fats and oils  Butter, stick margarine, lard, shortening, ghee, or christensen fat. Coconut, palm kernel, and palm oils.  Sweets and desserts  Corn syrup, sugars, honey, and molasses. Candy. Jam and jelly. Syrup. Sweetened cereals. Cookies, pies, cakes, donuts, muffins, and ice cream.  The items listed above may not be a complete list of foods and beverages you should avoid. Contact a dietitian for more information.  Summary  Choosing the right foods helps keep your fat and cholesterol at normal levels. This can keep you from getting certain diseases.  At meals, fill one-half of your plate with vegetables and green salads.  Eat high-fiber foods, like whole grains, beans, apples, carrots, peas, and barley.  Limit added sugar, saturated fats, alcohol, and fried foods.  This information is not intended to replace advice given to you by your health care provider. Make sure you discuss any questions you have with your health care provider.  Document Revised: 04/21/2021 Document Reviewed: 04/21/2021  [employer] Patient Education © 2021 [employer]    Limit fried foods.  Cook foods without frying them. Baking, boiling, grilling, and broiling are all great options.  Lose weight if you are overweight. Losing even a small amount of weight can help your overall health. It can also help prevent diseases such as diabetes and heart disease.  WHAT  FOODS CAN I EAT?  Grains  Whole grains, such as whole wheat or whole grain breads, crackers, cereals, and pasta. Unsweetened oatmeal, bulgur, barley, quinoa, or brown rice. Corn or whole wheat flour tortillas.  Vegetables  Fresh or frozen vegetables (raw, steamed, roasted, or grilled). Green salads.  Fruits  All fresh, canned (in natural juice), or frozen fruits.  Meat and Other Protein Products  Ground beef (85% or leaner), grass-fed beef, or beef trimmed of fat. Skinless chicken or turkey. Ground chicken or turkey. Pork trimmed of fat. All fish and seafood. Eggs. Dried beans, peas, or lentils. Unsalted nuts or seeds. Unsalted canned or dry beans.  Dairy  Low-fat dairy products, such as skim or 1% milk, 2% or reduced-fat cheeses, low-fat ricotta or cottage cheese, or plain low-fat yogurt.  Fats and Oils  Tub margarines without trans fats. Light or reduced-fat mayonnaise and salad dressings. Avocado. Olive, canola, sesame, or safflower oils. Natural peanut or almond butter (choose ones without added sugar and oil).  The items listed above may not be a complete list of recommended foods or beverages. Contact your dietitian for more options.  WHAT FOODS ARE NOT RECOMMENDED?  Grains  White bread. White pasta. White rice. Cornbread. Bagels, pastries, and croissants. Crackers that contain trans fat.  Vegetables  White potatoes. Corn. Creamed or fried vegetables. Vegetables in a cheese sauce.  Fruits  Dried fruits. Canned fruit in light or heavy syrup. Fruit juice.  Meat and Other Protein Products  Fatty cuts of meat. Ribs, chicken wings, christensen, sausage, bologna, salami, chitterlings, fatback, hot dogs, bratwurst, and packaged luncheon meats. Liver and organ meats.  Dairy  Whole or 2% milk, cream, half-and-half, and cream cheese. Whole milk cheeses. Whole-fat or sweetened yogurt. Full-fat cheeses. Nondairy creamers and whipped toppings. Processed cheese, cheese spreads, or cheese curds.  Sweets and Desserts  Corn syrup,  sugars, honey, and molasses. Candy. Jam and jelly. Syrup. Sweetened cereals. Cookies, pies, cakes, donuts, muffins, and ice cream.  Fats and Oils  Butter, stick margarine, lard, shortening, ghee, or christensen fat. Coconut, palm kernel, or palm oils.  Beverages  Alcohol. Sweetened drinks (such as sodas, lemonade, and fruit drinks or punches).  The items listed above may not be a complete list of foods and beverages to avoid. Contact your dietitian for more information.  Document Released: 06/18/2013 Document Revised: 08/21/2015 Document Reviewed: 03/19/2015  [employer] Patient Information ©2015 [employer]. This information is not intended to replace advice given to you by your health care provider. Make sure you discuss any questions you have with your health care provider. Cholesterol Content in Foods  Cholesterol is a waxy, fat-like substance that helps to carry fat in the blood. The body needs cholesterol in small amounts, but too much cholesterol can cause damage to the arteries and heart.  What foods have cholesterol?    Cholesterol is found in animal-based foods, such as meat, seafood, and dairy. Generally, low-fat dairy and lean meats have less cholesterol than full-fat dairy and fatty meats. The milligrams of cholesterol per serving (mg per serving) of common cholesterol-containing foods are listed below.  Meats and other proteins  Egg -- one large whole egg has 186 mg.  Veal shank -- 4 oz (113 g) has 141 mg.  Lean ground turkey (93% lean) -- 4 oz (113 g) has 118 mg.  Fat-trimmed lamb loin -- 4 oz (113 g) has 106 mg.  Lean ground beef (90% lean) -- 4 oz (113 g) has 100 mg.  Lobster -- 3.5 oz (99 g) has 90 mg.  Pork loin chops -- 4 oz (113 g) has 86 mg.  Canned salmon -- 3.5 oz (99 g) has 83 mg.  Fat-trimmed beef top loin -- 4 oz (113 g) has 78 mg.  Frankfurter -- 1 adriana (3.5 oz or 99 g) has 77 mg.  Crab -- 3.5 oz (99 g) has 71 mg.  Roasted chicken without skin, white meat -- 4 oz (113 g) has 66 mg.  Light  bologna -- 2 oz (57 g) has 45 mg.  Deli-cut turkey -- 2 oz (57 g) has 31 mg.  Canned tuna -- 3.5 oz (99 g) has 31 mg.  Cates -- 1 oz (28 g) has 29 mg.  Oysters and mussels (raw) -- 3.5 oz (99 g) has 25 mg.  Mackerel -- 1 oz (28 g) has 22 mg.  Trout -- 1 oz (28 g) has 20 mg.  Pork sausage -- 1 link (1 oz or 28 g) has 17 mg.  Barnhart -- 1 oz (28 g) has 16 mg.  Tilapia -- 1 oz (28 g) has 14 mg.  Dairy  Soft-serve ice cream -- ½ cup (4 oz or 86 g) has 103 mg.  Whole-milk yogurt -- 1 cup (8 oz or 245 g) has 29 mg.  Cheddar cheese -- 1 oz (28 g) has 28 mg.  American cheese -- 1 oz (28 g) has 28 mg.  Whole milk -- 1 cup (8 oz or 250 mL) has 23 mg.  2% milk -- 1 cup (8 oz or 250 mL) has 18 mg.  Cream cheese -- 1 tablespoon (Tbsp) (14.5 g) has 15 mg.  Cottage cheese -- ½ cup (4 oz or 113 g) has 14 mg.  Low-fat (1%) milk -- 1 cup (8 oz or 250 mL) has 10 mg.  Sour cream -- 1 Tbsp (12 g) has 8.5 mg.  Low-fat yogurt -- 1 cup (8 oz or 245 g) has 8 mg.  Nonfat Greek yogurt -- 1 cup (8 oz or 228 g) has 7 mg.  Half-and-half cream -- 1 Tbsp (15 mL) has 5 mg.  Fats and oils  Cod liver oil -- 1 tablespoon (Tbsp) (13.6 g) has 82 mg.  Butter -- 1 Tbsp (14 g) has 15 mg.  Lard -- 1 Tbsp (12.8 g) has 14 mg.  Cates grease -- 1 Tbsp (12.9 g) has 14 mg.  Mayonnaise -- 1 Tbsp (13.8 g) has 5-10 mg.  Margarine -- 1 Tbsp (14 g) has 3-10 mg.  The items listed above may not be a complete list of foods with cholesterol. Exact amounts of cholesterol in these foods may vary depending on specific ingredients and brands. Contact a dietitian for more information.  What foods do not have cholesterol?  Most plant-based foods do not have cholesterol unless you combine them with a food that has cholesterol. Foods without cholesterol include:  Grains and cereals.  Vegetables.  Fruits.  Vegetable oils, such as olive, canola, and sunflower oil.  Legumes, such as peas, beans, and lentils.  Nuts and seeds.  Egg whites.  The items listed above may not be a complete  list of foods that do not have cholesterol. Contact a dietitian for more information.  Summary  The body needs cholesterol in small amounts, but too much cholesterol can cause damage to the arteries and heart.  Cholesterol is found in animal-based foods, such as meat, seafood, and dairy. Generally, low-fat dairy and lean meats have less cholesterol than full-fat dairy and fatty meats.  This information is not intended to replace advice given to you by your health care provider. Make sure you discuss any questions you have with your health care provider.  Document Revised: 04/29/2022 Document Reviewed: 04/29/2022  [employer] Patient Education © 2024 [employer]

## 2025-02-17 NOTE — PROGRESS NOTES
"Chief Complaint  Physical    Subjective          Annual physical and to get prescriptions refilled.  Pertinent negative symptoms include no fatigue and no headaches.         The patient presents for an annual physical exam and medication refill.    He reports a generally good state of health over the past year, with no significant changes in his medical history. He has been managing gastroesophageal reflux disease (GERD) with pantoprazole, although he is uncertain about the specific dosage. He does not have asthma and is not a smoker. He has been experiencing mild fatigue recently, which he attributes to the weather. He reports no symptoms of headaches or dizziness and maintains a positive mood. He continues to take Zyrtec as needed. He had an eye exam earlier this month. They are looking at glaucoma, so the pressure is high in one of them. It is still not to the level where he needs treatment now, but they watch it every year.    SOCIAL HISTORY  - Does not smoke  - Does not drink alcohol  - Lives with wife and brother-in-law    MEDICATIONS  - Levothyroxine  - Pantoprazole  - Rosuvastatin  - Zyrtec (as needed)    IMMUNIZATIONS  - Due for influenza vaccine  - Due for tetanus vaccine  - Due for COVID-19 vaccine  - Due for shingles vaccine     Review of Systems   Constitutional:  Negative for fatigue.   HENT:  Positive for postnasal drip.    Neurological:  Negative for dizziness.   Psychiatric/Behavioral:  Negative for dysphoric mood.    All other systems reviewed and are negative.       Vital Signs:   Vitals:    02/17/25 1045   BP: 118/64   Pulse: 84   SpO2: 99%   Weight: 97.1 kg (214 lb)   Height: 188 cm (74\")            2/17/2025    10:50 AM   PHQ-2/PHQ-9 Depression Screening   Little interest or pleasure in doing things Not at all   Feeling down, depressed, or hopeless Not at all               Physical Exam  Constitutional:       Appearance: Normal appearance. He is well-developed.   HENT:      Head: Normocephalic. "      Right Ear: Hearing, tympanic membrane and external ear normal.      Left Ear: Hearing, tympanic membrane and external ear normal.   Eyes:      General: Lids are normal.      Conjunctiva/sclera: Conjunctivae normal.      Pupils: Pupils are equal, round, and reactive to light.      Funduscopic exam:     Right eye: No AV nicking or papilledema.         Left eye: No AV nicking or papilledema.   Neck:      Thyroid: No thyroid mass or thyromegaly.      Vascular: No carotid bruit or JVD.      Trachea: Trachea normal.   Cardiovascular:      Rate and Rhythm: Normal rate and regular rhythm.      Pulses: Normal pulses.      Heart sounds: Normal heart sounds. No murmur heard.  Pulmonary:      Effort: Pulmonary effort is normal.      Breath sounds: Normal breath sounds.   Abdominal:      General: Bowel sounds are normal.      Palpations: Abdomen is soft.      Tenderness: There is no abdominal tenderness.   Musculoskeletal:         General: Normal range of motion.      Cervical back: Normal range of motion and neck supple.      Lumbar back: No bony tenderness.   Lymphadenopathy:      Cervical: No cervical adenopathy.      Upper Body:      Right upper body: No supraclavicular adenopathy.      Left upper body: No supraclavicular adenopathy.   Skin:     General: Skin is warm and dry.      Findings: No rash.      Nails: There is no clubbing.   Neurological:      Mental Status: He is alert and oriented to person, place, and time.      Cranial Nerves: No cranial nerve deficit.      Deep Tendon Reflexes:      Reflex Scores:       Patellar reflexes are 2+ on the right side and 2+ on the left side.  Psychiatric:         Speech: Speech normal.         Behavior: Behavior normal.         Thought Content: Thought content normal.         Judgment: Judgment normal.          General Appearance: No Acute Distress, normal appearance, well developed, well nourished.  Vital signs: Vital Signs reviewed.  Respiratory: lungs clear to auscultation,  no wheezes, normal respiratory effort.  Cardiovascular: Regular rate and rhythm with no murmurs, rubs, or gallop. No carotid bruits auscultated bilaterally.  Extremities: No pretibial edema, bilaterally.  Skin: Warm and dry, no rash.  Psychiatric: patient cooperative, normal affect.       The following data was reviewed by: Gilson Lancaster MD on 02/17/2025:      Results  Laboratory Studies:  - Sodium: 139  - Potassium: 4.8  - Chloride: Normal  - Calcium: Normal  - BUN: 19  - Creatinine: 1.31  - Blood sugar: 95  - Glucose: 95  - Proteins: Good  - Liver tests: Good  - Bilirubin: 1.5  - TSH: 2.15 (normal range)  - Total cholesterol: 151  - HDL: 56  - LDL: 81  - Triglycerides: 72  - White count: 5.33  - Hemoglobin: 16.4  - PSA: 1.47                Assessment and Plan      1. Health maintenance.  - Administer influenza and tetanus vaccines today.  - Consider receiving the COVID-19 vaccine at a later date.  - Defer shingles vaccine until 2026.  - Send a prescription for a year's supply of levothyroxine 100 mcg, pantoprazole 20 mg, and rosuvastatin 10 mg to [facility].  - Order non-fasting blood work for May 2025.    2. Gastroesophageal reflux disease (GERD): Elevated creatinine.  - Reduce pantoprazole dosage to 20 mg, to be taken 30 minutes before breakfast.  - Recheck labs in 3 months.    Follow-up  - Make an appointment and get the labs done in May 2025. If results are normal, no need for a follow-up visit.         Encounter for wellness examination in adult  Immunizations given.  Continue with healthy diet and exercise.  Additional preventative measures discussed with the patient during today's visit.       Acquired hypothyroidism  The current medical regimen is effective;  continue present plan and medications.    Orders:    levothyroxine (SYNTHROID, LEVOTHROID) 100 MCG tablet; Take 1 tablet by mouth Every Evening.    Gastroesophageal reflux disease without esophagitis  Due to elevated creatinine, decrease  pantoprazole to 20 mg a.m. dosing 30 minutes before breakfast.  Recheck labs in 3 months  Orders:    pantoprazole (PROTONIX) 20 MG EC tablet; Take 1 tablet by mouth Every Morning Before Breakfast.    Pure hypercholesterolemia     Condition is stable. The current medical regimen is effective;  continue present plan and medications.  Orders:    rosuvastatin (CRESTOR) 10 MG tablet; Take 1 tablet by mouth Every Night.    Elevated serum creatinine  See above discussion regarding GERD.  Orders:    BUN; Future    Creatinine Serum (kidney function) GFR component; Future    Immunization due    Orders:    Tdap Vaccine Greater Than or Equal To 6yo IM    Fluzone >6mos ([id number])       Return in about 1 year (around 2/17/2026) for recheck/refill medication.     Patient was given instructions and counseling regarding his condition or for health maintenance advice. Please see specific information pulled into the AVS if appropriate.     Patient or patient representative verbalized consent for the use of Ambient Listening during the visit with  Gilson Lancaster MD for chart documentation. 2/17/2025  11:40 EST

## (undated) DEVICE — SUT VIC 2/0 CT2 27IN J269H

## (undated) DEVICE — IRRIGATOR BULB ASEPTO 60CC STRL

## (undated) DEVICE — DRAPE,SHOULDER,BEACH ULTRAGARD: Brand: MEDLINE

## (undated) DEVICE — UNDYED BRAIDED (POLYGLACTIN 910), SYNTHETIC ABSORBABLE SUTURE: Brand: COATED VICRYL

## (undated) DEVICE — KT POSTN ARM TRIMANO BEACH CHR W/DRP

## (undated) DEVICE — SKIN PREP TRAY W/CHG: Brand: MEDLINE INDUSTRIES, INC.

## (undated) DEVICE — TRAP FLD MINIVAC MEGADYNE 100ML

## (undated) DEVICE — DRSNG WND GZ CURAD OIL EMULSION 3X3IN STRL

## (undated) DEVICE — TBG PENCL TELESCP MEGADYNE SMOKE EVAC 10FT

## (undated) DEVICE — POOLE SUCTION HANDLE: Brand: CARDINAL HEALTH

## (undated) DEVICE — BLD SHAVER BONECUTTER 5MM 13CM

## (undated) DEVICE — PK ARTHSCP SHLDR TOWER 40

## (undated) DEVICE — BLANKT WARM LOWR/BDY 100X120CM

## (undated) DEVICE — TBG ARTHSCP PT W CONN/REDUC 8FT

## (undated) DEVICE — INTENT TO BE USED WITH SUTURE MATERIAL FOR TISSUE CLOSURE: Brand: RICHARD-ALLAN® NEEDLE 1/2 CIRCLE TAPER

## (undated) DEVICE — ABL ASP APOLLO RF XL 90D

## (undated) DEVICE — INTENDED FOR TISSUE SEPARATION, AND OTHER PROCEDURES THAT REQUIRE A SHARP SURGICAL BLADE TO PUNCTURE OR CUT.: Brand: BARD-PARKER ® CARBON RIB-BACK BLADES

## (undated) DEVICE — GLV SURG BIOGEL LTX PF 6 1/2

## (undated) DEVICE — TBG ARTHSCP PUMP W CONN/REDUC 8FT

## (undated) DEVICE — STRIP,CLOSURE,WOUND,MEDI-STRIP,1/2X4: Brand: MEDLINE